# Patient Record
Sex: MALE | Race: WHITE | NOT HISPANIC OR LATINO | Employment: FULL TIME | URBAN - METROPOLITAN AREA
[De-identification: names, ages, dates, MRNs, and addresses within clinical notes are randomized per-mention and may not be internally consistent; named-entity substitution may affect disease eponyms.]

---

## 2017-01-09 ENCOUNTER — COMMUNICATION - HEALTHEAST (OUTPATIENT)
Dept: FAMILY MEDICINE | Facility: CLINIC | Age: 37
End: 2017-01-09

## 2017-01-09 DIAGNOSIS — K21.9 ESOPHAGEAL REFLUX: ICD-10-CM

## 2017-03-16 ENCOUNTER — OFFICE VISIT (OUTPATIENT)
Dept: FAMILY MEDICINE | Facility: CLINIC | Age: 37
End: 2017-03-16
Payer: COMMERCIAL

## 2017-03-16 VITALS
HEART RATE: 54 BPM | SYSTOLIC BLOOD PRESSURE: 132 MMHG | TEMPERATURE: 98.2 F | OXYGEN SATURATION: 97 % | WEIGHT: 205.6 LBS | HEIGHT: 75 IN | RESPIRATION RATE: 22 BRPM | DIASTOLIC BLOOD PRESSURE: 76 MMHG | BODY MASS INDEX: 25.56 KG/M2

## 2017-03-16 DIAGNOSIS — J01.90 ACUTE SINUSITIS WITH SYMPTOMS > 10 DAYS: Primary | ICD-10-CM

## 2017-03-16 DIAGNOSIS — R07.0 THROAT PAIN: ICD-10-CM

## 2017-03-16 LAB
DEPRECATED S PYO AG THROAT QL EIA: NORMAL
MICRO REPORT STATUS: NORMAL
SPECIMEN SOURCE: NORMAL

## 2017-03-16 PROCEDURE — 87081 CULTURE SCREEN ONLY: CPT | Performed by: NURSE PRACTITIONER

## 2017-03-16 PROCEDURE — 99213 OFFICE O/P EST LOW 20 MIN: CPT | Performed by: NURSE PRACTITIONER

## 2017-03-16 PROCEDURE — 87880 STREP A ASSAY W/OPTIC: CPT | Performed by: NURSE PRACTITIONER

## 2017-03-16 RX ORDER — FLUTICASONE PROPIONATE 50 MCG
1-2 SPRAY, SUSPENSION (ML) NASAL DAILY
Qty: 16 G | Refills: 0 | Status: SHIPPED | OUTPATIENT
Start: 2017-03-16

## 2017-03-16 NOTE — LETTER
[facility]  [address] [place] [id number]  Phone: [phone]  Fax: [phone]    March 20, 2017    Price Gray  [address] [facility] [address]              Dear Grabiel Marina,        The results of your recent throat culture were negative.  If you have any further questions or concerns please contact the clinic            Sincerely,      Mili Chin CNP/ sara

## 2017-03-16 NOTE — PATIENT INSTRUCTIONS
Augmentin 875 bid for 10 days was prescribed.  Symptom Relief: Afdrin do not use greater then 3 days  Intranasal corticosteroid   Flonase has  been prescribed.     Tylenol or Ibuprofen if able to take for fevers and discomfort.  Do not exceed 4 grams of Tylenol in a 24 hour period.  Take Ibuprofen with food.      Follow up in 3-5 days if not improving or return sooner if worsening or fail to improve as anticipated.  Follow-up with your primary care provider next week and as needed.    Indications for emergent return to emergency department discussed with patient, who verbalized good understanding and agreement.  Patient understands the limitations of today's evaluation.         Sinusitis (Antibiotic Treatment)    The sinuses are air-filled spaces within the bones of the face. They connect to the inside of the nose. Sinusitis is an inflammation of the tissue lining the sinus cavity. Sinus inflammation can occur during a cold. It can also be due to allergies to pollens and other particles in the air. Sinusitis can cause symptoms of sinus congestion and fullness. A sinus infection causes fever, headache and facial pain. There is often green or yellow drainage from the nose or into the back of the throat (post-nasal drip). You have been given antibiotics to treat this condition.  Home care:    Take the full course of antibiotics as instructed. Do not stop taking them, even if you feel better.    Drink plenty of water, hot tea, and other liquids. This may help thin mucus. It also may promote sinus drainage.    Heat may help soothe painful areas of the face. Use a towel soaked in hot water. Or,  the shower and direct the hot spray onto your face. Using a vaporizer along with a menthol rub at night may also help.     An expectorant containing guaifenesin may help thin the mucus and promote drainage from the sinuses.    Over-the-counter decongestants may be used unless a similar medicine was prescribed. Nasal sprays  work the fastest. Use one that contains phenylephrine or oxymetazoline. First blow the nose gently. Then use the spray. Do not use these medicines more often than directed on the label or symptoms may get worse. You may also use tablets containing pseudoephedrine. Avoid products that combine ingredients, because side effects may be increased. Read labels. You can also ask the pharmacist for help. (NOTE: Persons with high blood pressure should not use decongestants. They can raise blood pressure.)    Over-the-counter antihistamines may help if allergies contributed to your sinusitis.      Do not use nasal rinses or irrigation during an acute sinus infection, unless told to by your health care provider. Rinsing may spread the infection to other sinuses.    Use acetaminophen or ibuprofen to control pain, unless another pain medicine was prescribed. (If you have chronic liver or kidney disease or ever had a stomach ulcer, talk with your doctor before using these medicines. Aspirin should never be used in anyone under 18 years of age who is ill with a fever. It may cause severe liver damage.)    Don't smoke. This can worsen symptoms.  Follow-up care  Follow up with your healthcare provider or our staff if you are not improving within the next week.  When to seek medical advice  Call your healthcare provider if any of these occur:    Facial pain or headache becoming more severe    Stiff neck    Unusual drowsiness or confusion    Swelling of the forehead or eyelids    Vision problems, including blurred or double vision    Fever of 100.4 F (38 C) or higher, or as directed by your healthcare provider    Seizure    Breathing problems    Symptoms not resolving within 10 days    6817-8126 [employer]. [address] [place] [address], [place] [address]. All rights reserved. This information is not intended as a substitute for professional medical care. Always follow your healthcare professional's instructions.

## 2017-03-16 NOTE — MR AVS SNAPSHOT
After Visit Summary   3/16/2017    Price Gray    MRN: [id number]           Patient Information     Date Of Birth          1980        Visit Information        Provider Department      3/16/2017 2:40 PM Mili Chin APRN [facility]        Today's Diagnoses     Throat pain    -  1    Acute sinusitis with symptoms > 10 days          Care Instructions    Augmentin 875 bid for 10 days was prescribed.  Symptom Relief: Afdrin do not use greater then 3 days  Intranasal corticosteroid   Flonase has  been prescribed.     Tylenol or Ibuprofen if able to take for fevers and discomfort.  Do not exceed 4 grams of Tylenol in a 24 hour period.  Take Ibuprofen with food.      Follow up in 3-5 days if not improving or return sooner if worsening or fail to improve as anticipated.  Follow-up with your primary care provider next week and as needed.    Indications for emergent return to emergency department discussed with patient, who verbalized good understanding and agreement.  Patient understands the limitations of today's evaluation.         Sinusitis (Antibiotic Treatment)    The sinuses are air-filled spaces within the bones of the face. They connect to the inside of the nose. Sinusitis is an inflammation of the tissue lining the sinus cavity. Sinus inflammation can occur during a cold. It can also be due to allergies to pollens and other particles in the air. Sinusitis can cause symptoms of sinus congestion and fullness. A sinus infection causes fever, headache and facial pain. There is often green or yellow drainage from the nose or into the back of the throat (post-nasal drip). You have been given antibiotics to treat this condition.  Home care:    Take the full course of antibiotics as instructed. Do not stop taking them, even if you feel better.    Drink plenty of water, hot tea, and other liquids. This may help thin mucus. It also may promote sinus drainage.    Heat may  help soothe painful areas of the face. Use a towel soaked in hot water. Or,  the shower and direct the hot spray onto your face. Using a vaporizer along with a menthol rub at night may also help.     An expectorant containing guaifenesin may help thin the mucus and promote drainage from the sinuses.    Over-the-counter decongestants may be used unless a similar medicine was prescribed. Nasal sprays work the fastest. Use one that contains phenylephrine or oxymetazoline. First blow the nose gently. Then use the spray. Do not use these medicines more often than directed on the label or symptoms may get worse. You may also use tablets containing pseudoephedrine. Avoid products that combine ingredients, because side effects may be increased. Read labels. You can also ask the pharmacist for help. (NOTE: Persons with high blood pressure should not use decongestants. They can raise blood pressure.)    Over-the-counter antihistamines may help if allergies contributed to your sinusitis.      Do not use nasal rinses or irrigation during an acute sinus infection, unless told to by your health care provider. Rinsing may spread the infection to other sinuses.    Use acetaminophen or ibuprofen to control pain, unless another pain medicine was prescribed. (If you have chronic liver or kidney disease or ever had a stomach ulcer, talk with your doctor before using these medicines. Aspirin should never be used in anyone under 18 years of age who is ill with a fever. It may cause severe liver damage.)    Don't smoke. This can worsen symptoms.  Follow-up care  Follow up with your healthcare provider or our staff if you are not improving within the next week.  When to seek medical advice  Call your healthcare provider if any of these occur:    Facial pain or headache becoming more severe    Stiff neck    Unusual drowsiness or confusion    Swelling of the forehead or eyelids    Vision problems, including blurred or double  "vision    Fever of 100.4 F (38 C) or higher, or as directed by your healthcare provider    Seizure    Breathing problems    Symptoms not resolving within 10 days    9425-6879 [employer]. [address] [place] [address], [place] [address]. All rights reserved. This information is not intended as a substitute for professional medical care. Always follow your healthcare professional's instructions.              Follow-ups after your visit        Who to contact     If you have questions or need follow up information about today's clinic visit or your schedule please contact [facility] directly at [phone].  Normal or non-critical lab and imaging results will be communicated to you by [employer], letter or phone within 4 business days after the clinic has received the results. If you do not hear from us within 7 days, please contact the clinic through [employer] or phone. If you have a critical or abnormal lab result, we will notify you by phone as soon as possible.  Submit refill requests through [employer] or call your pharmacy and they will forward the refill request to us. Please allow 3 business days for your refill to be completed.          Additional Information About Your Visit        MyChart Information     [employer] lets you send messages to your doctor, view your test results, renew your prescriptions, schedule appointments and more. To sign up, go to [email or web address] . Click on \"Log in\" on the left side of the screen, which will take you to the Welcome page. Then click on \"Sign up Now\" on the right side of the page.     You will be asked to enter the access code listed below, as well as some personal information. Please follow the directions to create your username and password.     Your access code is: [email or web address]  Expires: 2017  3:22 PM     Your access code will  in 90 days. If you need help or a new code, please call your [facility] or [phone].        Care " "EveryWhere ID     This is your Care EveryWhere ID. This could be used by other organizations to access your [facility] medical records  [id number]        Your Vitals Were     Pulse Temperature Respirations Height Pulse Oximetry BMI (Body Mass Index)    54 98.2  F (36.8  C) (Tympanic) 22 6' 3\" (1.905 m) 97% 25.7 kg/m2       Blood Pressure from Last 3 Encounters:   03/16/17 132/76    Weight from Last 3 Encounters:   03/16/17 205 lb 9.6 oz (93.3 kg)              We Performed the Following     Strep, Rapid Screen          Today's Medication Changes          These changes are accurate as of: 3/16/17  3:22 PM.  If you have any questions, ask your nurse or doctor.               Start taking these medicines.        Dose/Directions    amoxicillin-clavulanate 875-125 MG per tablet   Commonly known as:  AUGMENTIN   Used for:  Acute sinusitis with symptoms > 10 days   Started by:  Mili Chin APRN CNP        Dose:  1 tablet   Take 1 tablet by mouth 2 times daily   Quantity:  20 tablet   Refills:  0       fluticasone 50 MCG/ACT spray   Commonly known as:  FLONASE   Used for:  Acute sinusitis with symptoms > 10 days   Started by:  Mili Chin APRN CNP        Dose:  1-2 spray   Spray 1-2 sprays into both nostrils daily   Quantity:  16 g   Refills:  0            Where to get your medicines      These medications were sent to [id number] [address] [facility]  [address] [facility] [address]    Hours:  Closed 10-16-08 business to [facility] Phone:  [phone]     amoxicillin-clavulanate 875-125 MG per tablet    fluticasone 50 MCG/ACT spray                Primary Care Provider    None Specified       No primary provider on file.        Thank you!     Thank you for choosing [facility]  for your care. Our goal is always to provide you with excellent care. Hearing back from our patients is one way we can continue to improve our services. Please take a few minutes to " complete the written survey that you may receive in the mail after your visit with us. Thank you!             Your Updated Medication List - Protect others around you: Learn how to safely use, store and throw away your medicines at www.disposemymeds.org.          This list is accurate as of: 3/16/17  3:22 PM.  Always use your most recent med list.                   Brand Name Dispense Instructions for use    amoxicillin-clavulanate 875-125 MG per tablet    AUGMENTIN    20 tablet    Take 1 tablet by mouth 2 times daily       CLOBETASOL & CLOBETASOL EMUL EX          fluticasone 50 MCG/ACT spray    FLONASE    16 g    Spray 1-2 sprays into both nostrils daily       PLAQUENIL PO          PROTONIX PO

## 2017-03-16 NOTE — PROGRESS NOTES
"  SUBJECTIVE:                                                    Price Gray is a 36 year old male who presents to clinic today for the following health issues:      ENT Symptoms             Symptoms: cc Present Absent Comment   Fever/Chills    unkown   Fatigue  x     Muscle Aches  x     Eye Irritation   x    Sneezing   x    Nasal Miko/Drg  x     Sinus Pressure/Pain  x     Loss of smell   x    Dental pain   x    Sore Throat  x     Swollen Glands  x     Ear Pain/Fullness  x  pressure   Cough  x  Last week   Wheeze   x    Chest Pain   x    Shortness of breath   x    Rash   x    Other   x      Symptom duration:  8-9 days   Symptom severity:  moderate to severe   Treatments tried:  rest, ibuprofen   Contacts:  kids had strep       History reviewed. No pertinent past medical history.    Social History   Substance Use Topics     Smoking status: Current Every Day Smoker     Types: Dip, chew, snus or snuff     Smokeless tobacco: Not on file     Alcohol use Yes      Comment: weekly       ROS:  CONSTITUTIONAL:NEGATIVE for fever, chills, change in weight  INTEGUMENTARY/SKIN: NEGATIVE for worrisome rashes, moles or lesions  EYES: NEGATIVE for vision changes or irritation  ENT/MOUTH: See above   RESP:NEGATIVE for significant cough or SOB  CV: NEGATIVE for chest pain, palpitations or peripheral edema  GI: NEGATIVE for nausea, abdominal pain, heartburn, or change in bowel habits  MUSCULOSKELETAL: NEGATIVE for significant arthralgias or myalgia  NEURO: NEGATIVE for weakness, dizziness or paresthesias      OBJECTIVE:   /76  Pulse 54  Temp 98.2  F (36.8  C) (Tympanic)  Resp 22  Ht 6' 3\" (1.905 m)  Wt 205 lb 9.6 oz (93.3 kg)  SpO2 97%  BMI 25.7 kg/m2  General: healthy, alert and no distress  Eyes - conjunctivae clear.  Ears - External ears normal. Canals clear. TM's normal.  Nose/Sinuses - Nares normal.Mucosa normal. No drainage or sinus tenderness.  ENT: Maxillary sinus tenderness, bilateral turbinates inflamed and " edematous    Oropharynx - Lips, mucosa, and tongue normal. Positive findings: oropharyngeal erythema, no tonsillar hypertrophy no exudates present,   Neck - Neck supple;negative anterior cervical nodes,  Lungs - Lungs clear; no wheezing or rales.  Heart - regular rate and rhythm. No murmurs, rub.    Labs:  Results for orders placed or performed in visit on 03/16/17   Strep, Rapid Screen   Result Value Ref Range    Specimen Description Throat     Rapid Strep A Screen       NEGATIVE: No Group A streptococcal antigen detected by immunoassay, await   culture report.      Micro Report Status FINAL 03/16/2017          ASSESSMENT:     ICD-10-CM    1. Acute sinusitis with symptoms > 10 days J01.90 fluticasone (FLONASE) 50 MCG/ACT spray     amoxicillin-clavulanate (AUGMENTIN) 875-125 MG per tablet   2. Throat pain R07.0 Strep, Rapid Screen     Beta strep group A culture         PLAN:  Patient Instructions   Augmentin 875 bid for 10 days was prescribed.  Symptom Relief: Afdrin do not use greater then 3 days  Intranasal corticosteroid   Flonase has  been prescribed.     Tylenol or Ibuprofen if able to take for fevers and discomfort.  Do not exceed 4 grams of Tylenol in a 24 hour period.  Take Ibuprofen with food.      Follow up in 3-5 days if not improving or return sooner if worsening or fail to improve as anticipated.  Follow-up with your primary care provider next week and as needed.    Indications for emergent return to emergency department discussed with patient, who verbalized good understanding and agreement.  Patient understands the limitations of today's evaluation.         Sinusitis (Antibiotic Treatment)    The sinuses are air-filled spaces within the bones of the face. They connect to the inside of the nose. Sinusitis is an inflammation of the tissue lining the sinus cavity. Sinus inflammation can occur during a cold. It can also be due to allergies to pollens and other particles in the air. Sinusitis can cause  symptoms of sinus congestion and fullness. A sinus infection causes fever, headache and facial pain. There is often green or yellow drainage from the nose or into the back of the throat (post-nasal drip). You have been given antibiotics to treat this condition.  Home care:    Take the full course of antibiotics as instructed. Do not stop taking them, even if you feel better.    Drink plenty of water, hot tea, and other liquids. This may help thin mucus. It also may promote sinus drainage.    Heat may help soothe painful areas of the face. Use a towel soaked in hot water. Or,  the shower and direct the hot spray onto your face. Using a vaporizer along with a menthol rub at night may also help.     An expectorant containing guaifenesin may help thin the mucus and promote drainage from the sinuses.    Over-the-counter decongestants may be used unless a similar medicine was prescribed. Nasal sprays work the fastest. Use one that contains phenylephrine or oxymetazoline. First blow the nose gently. Then use the spray. Do not use these medicines more often than directed on the label or symptoms may get worse. You may also use tablets containing pseudoephedrine. Avoid products that combine ingredients, because side effects may be increased. Read labels. You can also ask the pharmacist for help. (NOTE: Persons with high blood pressure should not use decongestants. They can raise blood pressure.)    Over-the-counter antihistamines may help if allergies contributed to your sinusitis.      Do not use nasal rinses or irrigation during an acute sinus infection, unless told to by your health care provider. Rinsing may spread the infection to other sinuses.    Use acetaminophen or ibuprofen to control pain, unless another pain medicine was prescribed. (If you have chronic liver or kidney disease or ever had a stomach ulcer, talk with your doctor before using these medicines. Aspirin should never be used in anyone under 18  years of age who is ill with a fever. It may cause severe liver damage.)    Don't smoke. This can worsen symptoms.  Follow-up care  Follow up with your healthcare provider or our staff if you are not improving within the next week.  When to seek medical advice  Call your healthcare provider if any of these occur:    Facial pain or headache becoming more severe    Stiff neck    Unusual drowsiness or confusion    Swelling of the forehead or eyelids    Vision problems, including blurred or double vision    Fever of 100.4 F (38 C) or higher, or as directed by your healthcare provider    Seizure    Breathing problems    Symptoms not resolving within 10 days    8021-0265 [employer]. [address] [place] [address]. All rights reserved. This information is not intended as a substitute for professional medical care. Always follow your healthcare professional's instructions.               Mili Chin CNP

## 2017-03-18 LAB
BACTERIA SPEC CULT: NORMAL
MICRO REPORT STATUS: NORMAL
SPECIMEN SOURCE: NORMAL

## 2017-04-09 ENCOUNTER — COMMUNICATION - HEALTHEAST (OUTPATIENT)
Dept: FAMILY MEDICINE | Facility: CLINIC | Age: 37
End: 2017-04-09

## 2017-04-09 DIAGNOSIS — K21.9 ESOPHAGEAL REFLUX: ICD-10-CM

## 2017-06-24 ENCOUNTER — COMMUNICATION - HEALTHEAST (OUTPATIENT)
Dept: FAMILY MEDICINE | Facility: CLINIC | Age: 37
End: 2017-06-24

## 2017-06-24 DIAGNOSIS — K21.9 ESOPHAGEAL REFLUX: ICD-10-CM

## 2017-10-04 ENCOUNTER — COMMUNICATION - HEALTHEAST (OUTPATIENT)
Dept: FAMILY MEDICINE | Facility: CLINIC | Age: 37
End: 2017-10-04

## 2017-10-04 DIAGNOSIS — K21.9 ESOPHAGEAL REFLUX: ICD-10-CM

## 2018-01-01 ENCOUNTER — COMMUNICATION - HEALTHEAST (OUTPATIENT)
Dept: FAMILY MEDICINE | Facility: CLINIC | Age: 38
End: 2018-01-01

## 2018-01-01 DIAGNOSIS — K21.9 ESOPHAGEAL REFLUX: ICD-10-CM

## 2018-03-28 ENCOUNTER — COMMUNICATION - HEALTHEAST (OUTPATIENT)
Dept: FAMILY MEDICINE | Facility: CLINIC | Age: 38
End: 2018-03-28

## 2018-03-28 DIAGNOSIS — K21.9 ESOPHAGEAL REFLUX: ICD-10-CM

## 2018-06-26 ENCOUNTER — COMMUNICATION - HEALTHEAST (OUTPATIENT)
Dept: FAMILY MEDICINE | Facility: CLINIC | Age: 38
End: 2018-06-26

## 2018-06-26 DIAGNOSIS — K21.9 ESOPHAGEAL REFLUX: ICD-10-CM

## 2018-09-26 ENCOUNTER — COMMUNICATION - HEALTHEAST (OUTPATIENT)
Dept: FAMILY MEDICINE | Facility: CLINIC | Age: 38
End: 2018-09-26

## 2018-09-26 DIAGNOSIS — K21.9 ESOPHAGEAL REFLUX: ICD-10-CM

## 2018-12-26 ENCOUNTER — COMMUNICATION - HEALTHEAST (OUTPATIENT)
Dept: FAMILY MEDICINE | Facility: CLINIC | Age: 38
End: 2018-12-26

## 2018-12-26 DIAGNOSIS — K21.9 ESOPHAGEAL REFLUX: ICD-10-CM

## 2019-01-17 ENCOUNTER — OFFICE VISIT - HEALTHEAST (OUTPATIENT)
Dept: FAMILY MEDICINE | Facility: CLINIC | Age: 39
End: 2019-01-17

## 2019-01-17 DIAGNOSIS — Z00.00 ROUTINE PHYSICAL EXAMINATION: ICD-10-CM

## 2019-01-17 DIAGNOSIS — M13.0 POLYARTHRITIS: ICD-10-CM

## 2019-01-17 DIAGNOSIS — K21.9 GASTROESOPHAGEAL REFLUX DISEASE WITHOUT ESOPHAGITIS: ICD-10-CM

## 2019-01-17 LAB
ALBUMIN SERPL-MCNC: 3.9 G/DL (ref 3.5–5)
ALP SERPL-CCNC: 70 U/L (ref 45–120)
ALT SERPL W P-5'-P-CCNC: 22 U/L (ref 0–45)
ANION GAP SERPL CALCULATED.3IONS-SCNC: 9 MMOL/L (ref 5–18)
AST SERPL W P-5'-P-CCNC: 26 U/L (ref 0–40)
BILIRUB SERPL-MCNC: 0.9 MG/DL (ref 0–1)
BUN SERPL-MCNC: 18 MG/DL (ref 8–22)
CALCIUM SERPL-MCNC: 9.5 MG/DL (ref 8.5–10.5)
CHLORIDE BLD-SCNC: 102 MMOL/L (ref 98–107)
CHOLEST SERPL-MCNC: 224 MG/DL
CO2 SERPL-SCNC: 31 MMOL/L (ref 22–31)
CREAT SERPL-MCNC: 0.83 MG/DL (ref 0.7–1.3)
ERYTHROCYTE [DISTWIDTH] IN BLOOD BY AUTOMATED COUNT: 11.8 % (ref 11–14.5)
FASTING STATUS PATIENT QL REPORTED: YES
GFR SERPL CREATININE-BSD FRML MDRD: >60 ML/MIN/1.73M2
GLUCOSE BLD-MCNC: 79 MG/DL (ref 70–125)
HCT VFR BLD AUTO: 45.5 % (ref 40–54)
HDLC SERPL-MCNC: 71 MG/DL
HGB BLD-MCNC: 15.2 G/DL (ref 14–18)
LDLC SERPL CALC-MCNC: 143 MG/DL
MCH RBC QN AUTO: 28.6 PG (ref 27–34)
MCHC RBC AUTO-ENTMCNC: 33.4 G/DL (ref 32–36)
MCV RBC AUTO: 86 FL (ref 80–100)
PLATELET # BLD AUTO: 263 THOU/UL (ref 140–440)
PMV BLD AUTO: 6.4 FL (ref 7–10)
POTASSIUM BLD-SCNC: 4.3 MMOL/L (ref 3.5–5)
PROT SERPL-MCNC: 6.7 G/DL (ref 6–8)
RBC # BLD AUTO: 5.31 MILL/UL (ref 4.4–6.2)
SODIUM SERPL-SCNC: 142 MMOL/L (ref 136–145)
TRIGL SERPL-MCNC: 50 MG/DL
WBC: 4.7 THOU/UL (ref 4–11)

## 2019-01-17 ASSESSMENT — MIFFLIN-ST. JEOR: SCORE: 1933.65

## 2019-01-18 ENCOUNTER — COMMUNICATION - HEALTHEAST (OUTPATIENT)
Dept: FAMILY MEDICINE | Facility: CLINIC | Age: 39
End: 2019-01-18

## 2019-02-27 ENCOUNTER — COMMUNICATION - HEALTHEAST (OUTPATIENT)
Dept: FAMILY MEDICINE | Facility: CLINIC | Age: 39
End: 2019-02-27

## 2019-02-27 ENCOUNTER — AMBULATORY - HEALTHEAST (OUTPATIENT)
Dept: FAMILY MEDICINE | Facility: CLINIC | Age: 39
End: 2019-02-27

## 2019-02-27 DIAGNOSIS — K21.9 ESOPHAGEAL REFLUX: ICD-10-CM

## 2020-02-27 ENCOUNTER — COMMUNICATION - HEALTHEAST (OUTPATIENT)
Dept: FAMILY MEDICINE | Facility: CLINIC | Age: 40
End: 2020-02-27

## 2020-02-27 DIAGNOSIS — K21.9 ESOPHAGEAL REFLUX: ICD-10-CM

## 2020-02-28 ENCOUNTER — COMMUNICATION - HEALTHEAST (OUTPATIENT)
Dept: FAMILY MEDICINE | Facility: CLINIC | Age: 40
End: 2020-02-28

## 2020-02-28 DIAGNOSIS — K21.9 ESOPHAGEAL REFLUX: ICD-10-CM

## 2021-02-09 ENCOUNTER — OFFICE VISIT - HEALTHEAST (OUTPATIENT)
Dept: FAMILY MEDICINE | Facility: CLINIC | Age: 41
End: 2021-02-09

## 2021-02-09 DIAGNOSIS — M79.662 PAIN IN BOTH LOWER LEGS: ICD-10-CM

## 2021-02-09 DIAGNOSIS — M06.4 INFLAMMATORY POLYARTHROPATHY (H): ICD-10-CM

## 2021-02-09 DIAGNOSIS — M79.661 PAIN IN BOTH LOWER LEGS: ICD-10-CM

## 2021-02-09 DIAGNOSIS — K21.9 ESOPHAGEAL REFLUX: ICD-10-CM

## 2021-02-09 ASSESSMENT — PATIENT HEALTH QUESTIONNAIRE - PHQ9: SUM OF ALL RESPONSES TO PHQ QUESTIONS 1-9: 0

## 2021-02-11 ENCOUNTER — AMBULATORY - HEALTHEAST (OUTPATIENT)
Dept: LAB | Facility: CLINIC | Age: 41
End: 2021-02-11

## 2021-02-11 DIAGNOSIS — M79.661 PAIN IN BOTH LOWER LEGS: ICD-10-CM

## 2021-02-11 DIAGNOSIS — M79.662 PAIN IN BOTH LOWER LEGS: ICD-10-CM

## 2021-02-11 DIAGNOSIS — M06.4 INFLAMMATORY POLYARTHROPATHY (H): ICD-10-CM

## 2021-02-11 LAB
ALBUMIN SERPL-MCNC: 4.1 G/DL (ref 3.5–5)
ALP SERPL-CCNC: 55 U/L (ref 45–120)
ALT SERPL W P-5'-P-CCNC: 17 U/L (ref 0–45)
ANION GAP SERPL CALCULATED.3IONS-SCNC: 9 MMOL/L (ref 5–18)
AST SERPL W P-5'-P-CCNC: 24 U/L (ref 0–40)
BILIRUB SERPL-MCNC: 0.6 MG/DL (ref 0–1)
BUN SERPL-MCNC: 14 MG/DL (ref 8–22)
CALCIUM SERPL-MCNC: 9.3 MG/DL (ref 8.5–10.5)
CHLORIDE BLD-SCNC: 104 MMOL/L (ref 98–107)
CO2 SERPL-SCNC: 30 MMOL/L (ref 22–31)
CREAT SERPL-MCNC: 0.96 MG/DL (ref 0.7–1.3)
ERYTHROCYTE [DISTWIDTH] IN BLOOD BY AUTOMATED COUNT: 12.1 % (ref 11–14.5)
ERYTHROCYTE [SEDIMENTATION RATE] IN BLOOD BY WESTERGREN METHOD: 2 MM/HR (ref 0–15)
GFR SERPL CREATININE-BSD FRML MDRD: >60 ML/MIN/1.73M2
GLUCOSE BLD-MCNC: 73 MG/DL (ref 70–125)
HCT VFR BLD AUTO: 44.5 % (ref 40–54)
HGB BLD-MCNC: 15 G/DL (ref 14–18)
MAGNESIUM SERPL-MCNC: 2.1 MG/DL (ref 1.8–2.6)
MCH RBC QN AUTO: 28.5 PG (ref 27–34)
MCHC RBC AUTO-ENTMCNC: 33.7 G/DL (ref 32–36)
MCV RBC AUTO: 85 FL (ref 80–100)
PLATELET # BLD AUTO: 242 THOU/UL (ref 140–440)
PMV BLD AUTO: 8.5 FL (ref 7–10)
POTASSIUM BLD-SCNC: 4.4 MMOL/L (ref 3.5–5)
PROT SERPL-MCNC: 6.4 G/DL (ref 6–8)
RBC # BLD AUTO: 5.26 MILL/UL (ref 4.4–6.2)
SODIUM SERPL-SCNC: 143 MMOL/L (ref 136–145)
TSH SERPL DL<=0.005 MIU/L-ACNC: 1.39 UIU/ML (ref 0.3–5)
WBC: 3.1 THOU/UL (ref 4–11)

## 2021-02-12 ENCOUNTER — COMMUNICATION - HEALTHEAST (OUTPATIENT)
Dept: FAMILY MEDICINE | Facility: CLINIC | Age: 41
End: 2021-02-12

## 2021-02-12 LAB — ANA SER QL: 0.2 U

## 2021-02-16 ENCOUNTER — COMMUNICATION - HEALTHEAST (OUTPATIENT)
Dept: ADMINISTRATIVE | Facility: CLINIC | Age: 41
End: 2021-02-16

## 2021-05-27 ASSESSMENT — PATIENT HEALTH QUESTIONNAIRE - PHQ9: SUM OF ALL RESPONSES TO PHQ QUESTIONS 1-9: 0

## 2021-06-02 ENCOUNTER — RECORDS - HEALTHEAST (OUTPATIENT)
Dept: ADMINISTRATIVE | Facility: CLINIC | Age: 41
End: 2021-06-02

## 2021-06-02 VITALS — HEIGHT: 75 IN | WEIGHT: 209 LBS | BODY MASS INDEX: 25.99 KG/M2

## 2021-06-06 NOTE — TELEPHONE ENCOUNTER
RN cannot approve Refill Request    RN can NOT refill this medication Protocol failed and NO refill given.      Shanelle Laurent, [facility] Triage/Med Refill 2/28/2020    Requested Prescriptions   Pending Prescriptions Disp Refills     pantoprazole (PROTONIX) 40 MG tablet [Pharmacy Med Name: PANTOPRAZOLE 40MG] 90 tablet 3     Sig: TAKE 1 TABLET BY MOUTH EVERY DAY       GI Medications Refill Protocol Failed - 2/28/2020  9:25 AM        Failed - PCP or prescribing provider visit in last 12 or next 3 months.     Last office visit with prescriber/PCP: 5/3/2016 Tito Romero MD OR same dept: Visit date not found OR same specialty: 5/3/2016 Tito Romero MD  Last physical: 1/17/2019 Last MTM visit: Visit date not found   Next visit within 3 mo: Visit date not found  Next physical within 3 mo: Visit date not found  Prescriber OR PCP: Tito Romero MD  Last diagnosis associated with med order: 1. Esophageal reflux  - pantoprazole (PROTONIX) 40 MG tablet [Pharmacy Med Name: PANTOPRAZOLE 40MG]; TAKE 1 TABLET BY MOUTH EVERY DAY  Dispense: 90 tablet; Refill: 0    If protocol passes may refill for 12 months if within 3 months of last provider visit (or a total of 15 months).

## 2021-06-15 NOTE — TELEPHONE ENCOUNTER
----- Message from Tito Romero MD sent at 2/12/2021 10:17 AM CST -----  Thyroid levels are normal.  Kidney and liver function normal, no abnormalities with electrolytes to explain symptoms.  I would reach out to rheumatologist about a trial off the hydroxychloroquine to see if symptoms change.

## 2021-06-15 NOTE — TELEPHONE ENCOUNTER
Reason for call:  Patient reporting a symptom    Symptom or request: seen last week by Dr Romero for leg pain- pt reporting that this weekend he did have numbness & tingling in right leg which is new for him so wanted to report it.    Duration (how long have symptoms been present): 4 days- worse when sitting long periods    Have you been treated for this before? Yes    Additional comments: please advise    Phone Number patient can be reached at:  Home number on file [phone] (home)    Best Time:  na    Can we leave a detailed message on this number: Yes    Call taken on 2/16/2021 at 9:06 AM by Mandy Medellin

## 2021-06-15 NOTE — TELEPHONE ENCOUNTER
Pt stated he has not taken hydroxychloroquine in 4 days but also has not spoken to his rheumatologist.

## 2021-06-15 NOTE — PROGRESS NOTES
Price Gray is a 40 y.o. male who is being evaluated via a billable video visit.      How would you like to obtain your AVS? MyChart.  If dropped from the video visit, the video invitation should be resent by: Text to cell phone: [phone]  Will anyone else be joining your video visit? No      Video Start Time: 1053 am  Assessment & Plan     Pain in both lower legs  Discussed evaluating for possible different etiologies and starting with blood work.  Will assess for electrolyte deficiencies vitamin deficiencies thyroid abnormalities anemia autoimmune or inflammatory processes that could be causing patient's symptoms in his bilateral lower legs  If no abnormalities are noted on blood work discussed a bit possible trial off of hydroxychloroquine which he takes for his inflammatory polyarthritis.  Low percentage but is listed as a muscular side effect is myalgias which she is describing in proximal musculature.  - pantoprazole (PROTONIX) 40 MG tablet  Dispense: 90 tablet; Refill: 3  - HM2(CBC w/o Differential)  - Thyroid Josephine  - Comprehensive Metabolic Panel  - Antinuclear Antibody (RONNIE) Cascade  - Magnesium  - Sedimentation Rate    Esophageal reflux  Patient feels his pantoprazole is working well would like refill sent to the pharmacy  Of electrolyte disturbances or vitamin disturbances are shown consideration for decreasing Protonix  - pantoprazole (PROTONIX) 40 MG tablet  Dispense: 90 tablet; Refill: 3    Inflammatory polyarthropathy (H)  Patient is following with rheumatology is on hydroxychloroquine  Up-to-date review of possible side effects do include some myalgias of proximal musculature with hydroxychloroquine  We will search for other etiologies but consider trial off medication to see if this is a side effect  - pantoprazole (PROTONIX) 40 MG tablet  Dispense: 90 tablet; Refill: 3  - HM2(CBC w/o Differential)  - Thyroid Josephine  - Comprehensive Metabolic Panel  - Antinuclear Antibody (RONNIE)  Cascade  - Magnesium  - Sedimentation Rate    [id number]}     No follow-ups on file.    Tito Romero MD  [facility]    Subjective     Price Gray is 40 y.o. and presents to clinic today for the following health issues   HPI   40-year-old male interviewed via video visit during Covid pandemic concerns with bilateral leg abnormalities has been noted for the last few weeks.  Patient has been trying to increase activity and lose a few pounds he has been successful lost about 7 in the last few weeks but is noted marked discomfort and irritation in his bilateral lower extremities in the proximal musculature-sparing more distal muscle groups of the calf and feet.  He has no upper extremity symptoms.  He has been exercising on a regular basis and is really not been a new routine.  Discussed with him different possible etiologies for the myalgias he is describing in his bilateral legs and we will start with blood work for evaluation of different possibilities.  He has taken hydroxychloroquine for polyarthritis from his rheumatologist it is a possible side effect however low with myalgias we will consider going off hydroxychloroquine if there is no lab abnormalities.  There is been no trauma to the lumbar back to the patient's knowledge but he does have possible psoriatic arthritis-I think is of lower likelihood to be affecting the bilateral similar areas in the lower back to be causing bilateral leg discomfort.  These options were discussed with the patient is in agreement to start with lab work.  Refills of his proton pump inhibitor sent to the pharmacy.  If electrolyte or vitamin abnormalities noted consideration for lowering PPI dosing.      Review of Systems  Negative other than stated above      Objective    Vitals - Patient Reported  Weight (Patient Reported): 195 lb (88.5 kg)    Physical Exam  Video visit completed today  Patient at home appears in no apparent  distress  Otherwise appears well on video          Video-Visit Details    Type of service:  Video Visit    Video End Time (time video stopped): 11:09 AM  Originating Location (pt. Location): Home    Distant Location (provider location):  [facility]     Platform used for Video Visit: Favian

## 2021-06-23 NOTE — TELEPHONE ENCOUNTER
----- Message from Tito Romero MD sent at 1/18/2019  3:08 PM CST -----  Cholesterol levels have risen and are outside of goal range-I recommend working on getting regular cardiovascular exercise and lowering cholesterol in the diet.  Kidney function liver function are normal with no signs of diabetes.  No other concerns on blood work.

## 2021-06-23 NOTE — TELEPHONE ENCOUNTER
Reason contacted:  Lab results   Information relayed:  Message from provider below   Additional questions:  No  Further follow-up needed:  No  Okay to leave a detailed message:  Yes

## 2021-06-23 NOTE — PROGRESS NOTES
Assessment:      Healthy male exam.    [facility]  GERD  Polyarthritis  Plan:       All questions answered.   [facility]-will obtain blood work today and f/u based on results, update with flu vaccine today  GERD-patient doing well on current dosing we will send refills as needed-when he periodically forgets to take the medication he usually has acid reflux symptoms within the day-discussed with him to keep on taking the medication  Polyarthritis-within the last year patient has discontinued his Plaquenil-he has not had any further problems in the last year with joint problems since being off the medications and he plans on staying off of them until he has a flare  Subjective:      Price Gray is a 38 y.o. male who presents for an annual exam. The patient reports that there is not domestic violence in his life.  Patient is here for yearly exam.  He is fasting and interested in fasting blood work.  He is due for flu vaccine.  We reviewed last years laboratory values with him.  He takes a proton pump inhibitor which controls his symptoms well.  He had previously been on Plaquenil for polyarthritis in the past and is actually discontinued the medication on his own.  He has been off it for greater than a half of a year and has not had any problems.  He wants to continue to monitor.  He is happy not to be taking the medication especially with the follow-up that was needed for monitoring.  He continues to work as an .  He regularly exercises with cross-country skiing    Healthy Habits:   Regular Exercise: Yes  Sunscreen Use: Yes  Healthy Diet: Yes  Dental Visits Regularly: Yes  Seat Belt: Yes  Sexually active: Yes  Lipid Profile: Yes  Glucose Screen: Yes        Immunization History   Administered Date(s) Administered     DT (pediatric) 11/07/2006     Influenza, seasonal,quad inj 36+ mos 01/17/2019     Influenza, seasonal,quad inj 6-35 mos 11/12/2008, 01/11/2013     Td,adult,historic,unspecified 11/07/2006     Tdap  08/16/2012     Immunization status: up to date and documented.    No exam data present    Current Outpatient Medications   Medication Sig Dispense Refill     cholecalciferol, vitamin D3, (VITAMIN D3) 2,000 unit cap Take by mouth.       clobetasol (TEMOVATE) 0.05 % ointment Apply topically to skin as needed 30 g 2     multivitamin therapeutic (THERAGRAN) tablet Take 1 tablet by mouth daily.       omega-3 fatty acids-vitamin E (FISH OIL) 1,000 mg cap Take by mouth.       pantoprazole (PROTONIX) 40 MG tablet TAKE 1 TABLET BY MOUTH DAILY 30 tablet 1     No current facility-administered medications for this visit.      No past medical history on file.  Past Surgical History:   Procedure Laterality Date     UT KNEE SCOPE,DIAGNOSTIC      Description: Arthroscopy Knee Left;  Recorded: 08/09/2010;  Comments: 2000, meniscal repair     UT KNEE SCOPE,DIAGNOSTIC      Description: Arthroscopy Knee Right;  Recorded: 08/09/2010;  Comments: 1997, meniscal repair     UT REMOVAL OF SPERM DUCT(S)      Description: Surgery Of Male Genitalia Vasectomy;  Recorded: 09/24/2010;  Comments: 9/2010     Patient has no known allergies.  Family History   Problem Relation Age of Onset     Hypertension Father      Coronary artery disease Maternal Grandmother      Social History     Socioeconomic History     Marital status:      Spouse name: Not on file     Number of children: Not on file     Years of education: Not on file     Highest education level: Not on file   Social Needs     Financial resource strain: Not on file     Food insecurity - worry: Not on file     Food insecurity - inability: Not on file     Transportation needs - medical: Not on file     Transportation needs - non-medical: Not on file   Occupational History     Occupation:      Employer: [employer]   Tobacco Use     Smoking status: Former Smoker     Smokeless tobacco: Current User     Types: Chew   Substance and Sexual Activity     Alcohol use: Yes      "Comment: Rare     Drug use: Not on file     Sexual activity: Not on file   Other Topics Concern     Not on file   Social History Narrative            Review of Systems  General:  Denies problem  Eyes: Denies problem  Ears/Nose/Throat: Denies problem  Cardiovascular: Denies problem  Respiratory:  Denies problem  Gastrointestinal:  Denies problem  Genitourinary: Denies problem  Musculoskeletal:  Denies problem  Skin: Denies problem  Neurologic: Denies problem  Psychiatric: Denies problem  Endocrine: Denies problem  Heme/Lymphatic: Denies problem   Allergic/Immunologic: Denies problem        Objective:     Vitals:    01/17/19 0807   BP: 126/77   Pulse: 63   Resp: 16   Temp: 97  F (36.1  C)   TempSrc: Oral   Weight: 209 lb (94.8 kg)   Height: 6' 3\" (1.905 m)     Body mass index is 26.12 kg/m .    Physical  General Appearance: Alert, cooperative, no distress, appears stated age  Head: Normocephalic, without obvious abnormality, atraumatic  Eyes: PERRL, conjunctiva/corneas clear, EOM's intact  Ears: Normal TM's and external ear canals, both ears  Nose: Nares normal, septum midline,mucosa normal, no drainage  Throat: Lips, mucosa, and tongue normal; teeth and gums normal  Neck: Supple, symmetrical, trachea midline, no adenopathy;  thyroid: not enlarged, symmetric, no tenderness/mass/nodules; no carotid bruit or JVD  Back: Symmetric, no curvature, ROM normal, no CVA tenderness  Lungs: Clear to auscultation bilaterally, respirations unlabored  Heart: Regular rate and rhythm, S1 and S2 normal, no murmur, rub, or gallop,  Abdomen: Soft, non-tender, bowel sounds active all four quadrants,  no masses, no organomegaly  Genitourinary: deferred, no concerns  Musculoskeletal: Normal range of motion. No joint swelling or deformity.   Extremities: Extremities normal, atraumatic, no cyanosis or edema  Skin: Skin color, texture, turgor normal, no rashes or lesions  Lymph nodes: Cervical, supraclavicular, and " axillary nodes normal  Neurologic: He is alert. He has normal reflexes.   Psychiatric: He has a normal mood and affect.

## 2021-06-24 NOTE — TELEPHONE ENCOUNTER
Refill Approved    Rx renewed per Medication Renewal Policy. Medication was last renewed on 12/27/18.    Shanelle Laurent, [facility] Triage/Med Refill 2/27/2019     Requested Prescriptions   Pending Prescriptions Disp Refills     pantoprazole (PROTONIX) 40 MG tablet 30 tablet 1     Sig: Take 1 tablet (40 mg total) by mouth daily.    GI Medications Refill Protocol Passed - 2/27/2019  3:19 PM       Passed - PCP or prescribing provider visit in last 12 or next 3 months.    Last office visit with prescriber/PCP: 5/3/2016 Tito Romero MD OR same dept: Visit date not found OR same specialty: 5/3/2016 Tito Romero MD  Last physical: 1/17/2019 Last MTM visit: Visit date not found   Next visit within 3 mo: Visit date not found  Next physical within 3 mo: Visit date not found  Prescriber OR PCP: Tito Romero MD  Last diagnosis associated with med order: 1. Esophageal reflux  - pantoprazole (PROTONIX) 40 MG tablet; Take 1 tablet (40 mg total) by mouth daily.  Dispense: 30 tablet; Refill: 1    If protocol passes may refill for 12 months if within 3 months of last provider visit (or a total of 15 months).

## 2021-06-24 NOTE — TELEPHONE ENCOUNTER
Refill Request  Did you contact pharmacy: Yes  Medication name:   pantoprazole (PROTONIX) 40 MG tablet  Who prescribed the medication: Guerda Santos MD  Pharmacy Name and Location: [facility] [address] [place] [address] [facility] [address] [facility]  Is patient out of medication: Yes  Patient notified refills processed in 72 hours:  yes  Okay to leave a detailed message: yes    Patient is requesting 90 days supply.

## 2021-06-24 NOTE — TELEPHONE ENCOUNTER
Medication Question or Clarification  Who is calling: Patient  What medication are you calling about?: clobetasol (TEMOVATE) 0.05 % ointment  What dose do you take?: N/A  How often are you taking the medication?: N/A  Who prescribed the medication?: Tito Romero MD  What is your question/concern?: Patient states that he had talked to Dr. Romero and there was another ointment that provider had recommended that is not as expensive as this one.  Patient did not remember what it was called but will like it sent over to pharmacy.  Please advise.  Pharmacy: [facility] - [place], [place] - [address] [place] [address] AT [address]  Okay to leave a detailed message?: Yes  Site CMT - Please call the pharmacy to obtain any additional needed information.

## 2021-08-19 ENCOUNTER — ANCILLARY PROCEDURE (OUTPATIENT)
Dept: ULTRASOUND IMAGING | Facility: CLINIC | Age: 41
End: 2021-08-19
Attending: EMERGENCY MEDICINE
Payer: COMMERCIAL

## 2021-08-19 ENCOUNTER — HOSPITAL ENCOUNTER (EMERGENCY)
Facility: CLINIC | Age: 41
Discharge: HOME | End: 2021-08-20
Attending: EMERGENCY MEDICINE | Admitting: EMERGENCY MEDICINE
Payer: COMMERCIAL

## 2021-08-19 ENCOUNTER — OFFICE VISIT (OUTPATIENT)
Dept: FAMILY MEDICINE | Facility: CLINIC | Age: 41
End: 2021-08-19
Payer: COMMERCIAL

## 2021-08-19 ENCOUNTER — HOSPITAL ENCOUNTER (EMERGENCY)
Facility: HOSPITAL | Age: 41
Discharge: HOME | End: 2021-08-19
Payer: COMMERCIAL

## 2021-08-19 VITALS
HEART RATE: 70 BPM | SYSTOLIC BLOOD PRESSURE: 127 MMHG | BODY MASS INDEX: 24.66 KG/M2 | OXYGEN SATURATION: 99 % | WEIGHT: 197.3 LBS | DIASTOLIC BLOOD PRESSURE: 85 MMHG | TEMPERATURE: 97.5 F | RESPIRATION RATE: 12 BRPM

## 2021-08-19 VITALS
OXYGEN SATURATION: 100 % | DIASTOLIC BLOOD PRESSURE: 76 MMHG | RESPIRATION RATE: 18 BRPM | TEMPERATURE: 97.7 F | BODY MASS INDEX: 24.62 KG/M2 | HEART RATE: 67 BPM | SYSTOLIC BLOOD PRESSURE: 137 MMHG | WEIGHT: 197 LBS

## 2021-08-19 DIAGNOSIS — K56.7 ILEUS OF UNSPECIFIED TYPE (H): ICD-10-CM

## 2021-08-19 DIAGNOSIS — D72.810 LYMPHOPENIA: ICD-10-CM

## 2021-08-19 DIAGNOSIS — R10.31 ABDOMINAL PAIN, ACUTE, RIGHT LOWER QUADRANT: Primary | ICD-10-CM

## 2021-08-19 DIAGNOSIS — Z11.52 ENCOUNTER FOR SCREENING LABORATORY TESTING FOR SEVERE ACUTE RESPIRATORY SYNDROME CORONAVIRUS 2 (SARS-COV-2): ICD-10-CM

## 2021-08-19 DIAGNOSIS — Z87.2 HISTORY OF PSORIATIC ARTHRITIS: ICD-10-CM

## 2021-08-19 DIAGNOSIS — R93.89 ABNORMAL CT SCAN: ICD-10-CM

## 2021-08-19 DIAGNOSIS — R10.84 ABDOMINAL PAIN, GENERALIZED: ICD-10-CM

## 2021-08-19 DIAGNOSIS — K56.609 SBO (SMALL BOWEL OBSTRUCTION) (H): ICD-10-CM

## 2021-08-19 LAB
ALBUMIN SERPL-MCNC: 3.3 G/DL (ref 3.4–5)
ALP SERPL-CCNC: 51 U/L (ref 40–150)
ALT SERPL W P-5'-P-CCNC: 24 U/L (ref 0–70)
ANION GAP SERPL CALCULATED.3IONS-SCNC: 6 MMOL/L (ref 3–14)
AST SERPL W P-5'-P-CCNC: 15 U/L (ref 0–45)
BASOPHILS # BLD AUTO: 0 10E3/UL (ref 0–0.2)
BASOPHILS # BLD AUTO: 0 10E3/UL (ref 0–0.2)
BASOPHILS NFR BLD AUTO: 0 %
BASOPHILS NFR BLD AUTO: 1 %
BILIRUB SERPL-MCNC: 1.1 MG/DL (ref 0.2–1.3)
BUN SERPL-MCNC: 14 MG/DL (ref 7–30)
CALCIUM SERPL-MCNC: 8.6 MG/DL (ref 8.5–10.1)
CHLORIDE BLD-SCNC: 100 MMOL/L (ref 94–109)
CO2 SERPL-SCNC: 33 MMOL/L (ref 20–32)
CREAT SERPL-MCNC: 0.89 MG/DL (ref 0.66–1.25)
EOSINOPHIL # BLD AUTO: 0.1 10E3/UL (ref 0–0.7)
EOSINOPHIL # BLD AUTO: 0.2 10E3/UL (ref 0–0.7)
EOSINOPHIL NFR BLD AUTO: 4 %
EOSINOPHIL NFR BLD AUTO: 4 %
ERYTHROCYTE [DISTWIDTH] IN BLOOD BY AUTOMATED COUNT: 11.6 % (ref 10–15)
ERYTHROCYTE [DISTWIDTH] IN BLOOD BY AUTOMATED COUNT: 11.6 % (ref 10–15)
GFR SERPL CREATININE-BSD FRML MDRD: >90 ML/MIN/1.73M2
GLUCOSE BLD-MCNC: 99 MG/DL (ref 70–99)
HCT VFR BLD AUTO: 43 % (ref 40–53)
HCT VFR BLD AUTO: 44.2 % (ref 40–53)
HGB BLD-MCNC: 14.7 G/DL (ref 13.3–17.7)
HGB BLD-MCNC: 15 G/DL (ref 13.3–17.7)
HOLD SPECIMEN: NORMAL
IMM GRANULOCYTES # BLD: 0 10E3/UL
IMM GRANULOCYTES # BLD: 0 10E3/UL
IMM GRANULOCYTES NFR BLD: 0 %
IMM GRANULOCYTES NFR BLD: 0 %
LYMPHOCYTES # BLD AUTO: 0.8 10E3/UL (ref 0.8–5.3)
LYMPHOCYTES # BLD AUTO: 1.1 10E3/UL (ref 0.8–5.3)
LYMPHOCYTES NFR BLD AUTO: 24 %
LYMPHOCYTES NFR BLD AUTO: 28 %
MCH RBC QN AUTO: 28.4 PG (ref 26.5–33)
MCH RBC QN AUTO: 28.8 PG (ref 26.5–33)
MCHC RBC AUTO-ENTMCNC: 33.9 G/DL (ref 31.5–36.5)
MCHC RBC AUTO-ENTMCNC: 34.2 G/DL (ref 31.5–36.5)
MCV RBC AUTO: 83 FL (ref 78–100)
MCV RBC AUTO: 85 FL (ref 78–100)
MONOCYTES # BLD AUTO: 0.4 10E3/UL (ref 0–1.3)
MONOCYTES # BLD AUTO: 0.4 10E3/UL (ref 0–1.3)
MONOCYTES NFR BLD AUTO: 12 %
MONOCYTES NFR BLD AUTO: 13 %
NEUTROPHILS # BLD AUTO: 2 10E3/UL (ref 1.6–8.3)
NEUTROPHILS # BLD AUTO: 2.1 10E3/UL (ref 1.6–8.3)
NEUTROPHILS NFR BLD AUTO: 55 %
NEUTROPHILS NFR BLD AUTO: 59 %
NRBC # BLD AUTO: 0 10E3/UL
NRBC BLD AUTO-RTO: 0 /100
PLATELET # BLD AUTO: 245 10E3/UL (ref 150–450)
PLATELET # BLD AUTO: 261 10E3/UL (ref 150–450)
POTASSIUM BLD-SCNC: 3.8 MMOL/L (ref 3.4–5.3)
PROT SERPL-MCNC: 6.7 G/DL (ref 6.8–8.8)
RBC # BLD AUTO: 5.17 10E6/UL (ref 4.4–5.9)
RBC # BLD AUTO: 5.21 10E6/UL (ref 4.4–5.9)
SODIUM SERPL-SCNC: 139 MMOL/L (ref 133–144)
WBC # BLD AUTO: 3.4 10E3/UL (ref 4–11)
WBC # BLD AUTO: 3.8 10E3/UL (ref 4–11)

## 2021-08-19 PROCEDURE — 36415 COLL VENOUS BLD VENIPUNCTURE: CPT | Performed by: EMERGENCY MEDICINE

## 2021-08-19 PROCEDURE — 76705 ECHO EXAM OF ABDOMEN: CPT | Performed by: EMERGENCY MEDICINE

## 2021-08-19 PROCEDURE — C9803 HOPD COVID-19 SPEC COLLECT: HCPCS | Performed by: EMERGENCY MEDICINE

## 2021-08-19 PROCEDURE — 76705 ECHO EXAM OF ABDOMEN: CPT | Mod: 26 | Performed by: EMERGENCY MEDICINE

## 2021-08-19 PROCEDURE — 85025 COMPLETE CBC W/AUTO DIFF WBC: CPT | Performed by: EMERGENCY MEDICINE

## 2021-08-19 PROCEDURE — 99285 EMERGENCY DEPT VISIT HI MDM: CPT | Mod: 25 | Performed by: EMERGENCY MEDICINE

## 2021-08-19 PROCEDURE — 36415 COLL VENOUS BLD VENIPUNCTURE: CPT | Performed by: PHYSICIAN ASSISTANT

## 2021-08-19 PROCEDURE — 258N000003 HC RX IP 258 OP 636: Performed by: EMERGENCY MEDICINE

## 2021-08-19 PROCEDURE — 99215 OFFICE O/P EST HI 40 MIN: CPT | Performed by: PHYSICIAN ASSISTANT

## 2021-08-19 PROCEDURE — 85025 COMPLETE CBC W/AUTO DIFF WBC: CPT | Performed by: PHYSICIAN ASSISTANT

## 2021-08-19 PROCEDURE — 82040 ASSAY OF SERUM ALBUMIN: CPT | Performed by: EMERGENCY MEDICINE

## 2021-08-19 PROCEDURE — 80053 COMPREHEN METABOLIC PANEL: CPT | Performed by: EMERGENCY MEDICINE

## 2021-08-19 PROCEDURE — 96360 HYDRATION IV INFUSION INIT: CPT | Performed by: EMERGENCY MEDICINE

## 2021-08-19 RX ORDER — PANTOPRAZOLE SODIUM 40 MG/1
40 TABLET, DELAYED RELEASE ORAL DAILY
COMMUNITY
Start: 2021-05-26 | End: 2022-03-01

## 2021-08-19 RX ORDER — HYDROXYCHLOROQUINE SULFATE 200 MG/1
TABLET, FILM COATED ORAL
COMMUNITY
Start: 2021-05-26

## 2021-08-19 RX ADMIN — SODIUM CHLORIDE 1000 ML: 9 INJECTION, SOLUTION INTRAVENOUS at 19:07

## 2021-08-19 NOTE — PROGRESS NOTES
Patient presents with:  Abdominal Pain: Cramping x4 days (making it hard to eat)       Clinical Decision Making:  I spoke the radiologist regarding the findings on the CT of the abdomen.  Multiple etiologies and diagnoses were considered based on the finding of the CT scan.  The conversation with the radiologist did show that it could be early ileus from a viral enteritis, there were no noted masses or obstruction there was possible irritable bowel syndrome from a stricture and there was small bowel dilation and air-fluid levels were noted.  Because the patient is sick and he is having a 4 to 5-day history of feeling poorly, he did have a early nausea and vomiting on the first day of his 5-day history of illness.  He still passing flatus by rectum and had a very small bowel movement this morning I do not believe that there is a small bowel obstruction but it could be early ileus or early bowel obstruction.  Patient has been directed to the emergency room for consultation with general surgery and further work-up.  Possible concern would be small bowel follow-through or reimaging with x-ray looking for obstruction.  Consult [facility] with general surgery and consideration for admission to the floor for observation would also be a consideration.  I gave report to the emergency room staff and patient is sent over by personal vehicle for hydration and treatment.    40 min spent on the date of the encounter in chart review, patient visit, review of tests, documentation and/ordiscussion with other providers about the issues documented above.       ICD-10-CM    1. Abdominal pain, acute, right lower quadrant  R10.31 CBC with platelets and differential     CT Abdomen Pelvis w Contrast   2. Ileus of unspecified type (H)  K56.7    3. Lymphopenia  D72.810        Patient Instructions     Do not eat or drink anything by mouth.  To the emergency room for definitive evaluation and treatment for possible ileus or early small bowel  obstruction.          Patient Education     Small Bowel Obstruction  A small bowel obstruction occurs when part or all of the small intestine (bowel) is blocked. As a result, digestive contents can t move through the bowel and out of the body correctly. Treatment is needed right away to remove the blockage. This can ease painful symptoms. It can also prevent serious problems, such as tissue death or bursting (rupture) of the small bowel. Without treatment, a small bowel obstruction can be fatal.      Small bowel obstruction can lead to tissue damage and even tissue death.   Causes of small bowel obstruction  A small bowel obstruction can be caused by:     Scar tissue (adhesions).  These may form after belly (abdominal) surgery or an infection.    Hernia. A hernia is when an organ pushes through a weak spot or tear in the abdomen wall. Part of the small bowel can push out and be seen as a bulge under the belly. Hernias can also occur internally.    Certain health problems.  These include when part of the bowel slides inside another part (intussusception). Other causes include irritable bowel disease such as Crohn s disease, and inflammation and sores in the intestine (ulcerative colitis).    Abnormal tissue growths (tumors).  These can form on the inside or outside of the small bowel. They are usually due to cancer.  Symptoms of small bowel obstruction   Common symptoms include:    Belly cramping and pain    Belly swelling and bloating    Upset stomach (nausea) and vomiting    Can't  pass gas    Can't pass stool (constipation)    Diarrhea  Diagnosing small bowel obstruction  Your provider will ask about your symptoms and health history. You ll also have a physical exam. Tests may also be done to confirm the problem. These can include:     Imaging tests. These provide pictures of the small bowel. Common tests include X-rays and a CT scan.    Blood tests. These check for infection and other problems, such as excess  fluid loss (dehydration).    Upper GI (gastrointestinal) series with a small bowel follow-through.  This test takes X-rays of the upper digestive tract from the mouth through the small bowel. An X-ray dye (contrast fluid) is used. The dye coats the inside of your upper digestive tract so it will show up clearly on X-rays.  Treating small bowel obstruction  Treatment takes place in a hospital. As part of your care, the following may be done:     No food or drink is given by mouth. This allows your bowels to rest.    An IV (intravenous) line is placed in a vein in your arm or hand. The IV line is used to give fluids. It may also be used to give medicines. These may be needed to ease pain, nausea, and other symptoms. They may also be needed to treat or prevent infections.    A soft, thin, flexible tube (nasogastric tube) is inserted through your nose and into your stomach. The tube is used to remove extra gas and fluid in your stomach and bowels. This helps to ease symptoms such as pain and swelling.    In some cases, surgery is done. This may be needed if the small bowel is almost or totally blocked, if symptoms continue even after treatment, or if there is a hole in the bowel (bowel perforation). During surgery, the blockage is removed. Parts of the bowel may also be removed if there is tissue death. Other repair may be done as well, depending on what caused the blockage. Your healthcare provider will give you more information about surgery, if needed.    You ll be watched closely in the hospital until your symptoms improve. Your provider will tell you when you can go home.  Long-term concerns   After treatment, most people recover with no lasting effects. If a long part of the bowel is removed, there is a greater chance for lifelong digestive problems. Bowel movements may become irregular. Work with your provider to learn the best ways to manage any symptoms you may have, and to protect your health.   When to call  your healthcare provider  Call your provider right away if you have any of the following:     Severe pain (call 911)    Belly swelling or cramping that won t go away    Can t pass stool or gas    Nausea or vomiting (especially if the vomit looks or smells like stool)  [place] last reviewed this educational content on 6/1/2019 2000-2021 [employer]. All rights reserved. This information is not intended as a substitute for professional medical care. Always follow your healthcare professional's instructions.           Patient Education     Ileus  Ileus occurs when there is a problem with motility in the stomach and small or large intestine (bowel). Motility is the movement of food and waste through the digestive tract. Ileus is not caused by a physical blockage (obstruction).     Normally, muscles in the bowel walls squeeze (contract) to move waste along. Signals from nerves tell the muscles when to contract. With ileus, this movement slows down or stops completely. As a result, waste can t move through the bowels and out of the body. This can cause belly (abdominal) pain and other symptoms. Treatment is needed to restore normal movement and ease symptoms.      The digestive tract.   Causes of ileus  Ileus can be caused by the following:     Abdominal surgery    Abdominal infection    Injury to blood vessels that supply blood to the abdomen    Low levels of sodium or potassium (electrolyte imbalance)    Certain medicines, such as opioid pain medicines    Certain kidney or lung diseases    Certain health problems, such as cystic fibrosis and diabetes   Symptoms of ileus  Common symptoms of ileus include:     Belly swelling or bloating    Upset stomach (nausea) and vomiting    Belly cramps    Constipation or diarrhea     Loss of appetite    Not able to keep food down    Not able to pass stool or gas  Diagnosing ileus  Your healthcare provider will ask about your symptoms and health history. You ll also  have a physical exam. If your provider thinks you may have ileus, tests may be done to confirm the problem. These can include:     Imaging tests. These provide pictures of the bowels. Common tests include X-rays and a CT scan.    Blood tests. These are done to check for infection and other problems, such as fluid loss (dehydration).    Upper GI (gastrointestinal) series.  This test takes X-rays of the upper digestive tract, from the mouth to the small intestine. An X-ray dye (contrast fluid) is used. The dye coats the inside of the upper digestive tract. This makes it show up clearly on X-rays.  Treating ileus  In most cases, ileus goes away by itself when the main cause clears up. The goal is to manage symptoms until movement in the digestive tract returns to normal. Treatment takes place in a hospital. As part of your care, the following may be done:     No food or drink is given by mouth. This allows your bowels to rest.    An IV (intravenous) line is placed in a vein in your arm or hand. The IV line is used to give fluids and nutrition. It may also be used to give medicines. These may be needed to improve movement in your digestive tract, or to ease pain. They may also be needed to treat any underlying infections or conditions you have.    A soft, thin, flexible tube (nasogastric tube) is inserted through your nose and into your stomach. The tube is used to remove extra gas and fluid in your stomach and bowels. This helps to ease symptoms such as pain and swelling.    You ll be watched closely in the hospital until your symptoms get better. Your provider will tell you when you re well enough to go home. This is often in a few days.    In rare cases, problems may occur. Other treatments, such as surgery, may then be done. Your provider will tell you more about other treatments, if needed.  Long-term concerns   After treatment, most people fully recover. In some cases, you may need to see your provider for a  follow-up appointment.   When to call the healthcare provider  Call your healthcare provider right away if you have any of the following:     Fever of 100.4 F (38 C) or higher, or as advised by your provider    Chills    Belly swelling or pain that won t go away    Not able to pass stool or gas    Upset stomach and vomiting    Getting full very easily with only small amounts of food or drink    Bleeding from the rectum    Black, tarry stool  [place] last reviewed this educational content on 6/1/2019 2000-2021 [employer]. All rights reserved. This information is not intended as a substitute for professional medical care. Always follow your healthcare professional's instructions.               HPI:  Price Gray is a 41 year old male who presents today for a 4-day history of right lower quadrant abdominal pain.  Patient has increased pain when he moves or changes position or walks or goes over a bump or strikes his heel.  She has had anorexia bloating increased burping and farting and also early satiety.  Patient had 1 bout of emesis on Sunday, he has not had fever chills or night sweats no continued diarrhea with 1 bout of loose watery stools on Sunday.  Has not had any urinary symptoms, no cough skin rash or other symptoms to report.  Is not tried treatment for this at home.  Describes abdominal pain as a 7 or 8 out of 10 and is constant.    History obtained from chart review and the patient.    Problem List:  Esophageal Reflux  Polyarthritis  Pneumonia  Cough  Dizziness  Leukopenia      No past medical history on file.    Social History     Tobacco Use     Smoking status: Former Smoker     Types: Dip, chew, snus or snuff     Smokeless tobacco: Current User     Types: Chew, Chew   Substance Use Topics     Alcohol use: Yes     Comment: Alcoholic Drinks/day: Rare       Review of Systems  As above in HPI otherwise negative.    Vitals:    08/19/21 1010   BP: 127/85   BP Location: Right arm    Patient Position: Sitting   Cuff Size: Adult Regular   Pulse: 70   Resp: 12   Temp: 97.5  F (36.4  C)   TempSrc: Oral   SpO2: 99%   Weight: 89.5 kg (197 lb 4.8 oz)     General: Patient is uncomfortable in the office but is no acute distress is afebrile  Does ambulate into the office encounter.  He does have pain with heel strike and movement.  HEENT: Head is normocephalic atraumatic   eyes are PERRL EOMI sclera anicteric   TMs are clear bilaterally  Throat is clear  and no exudate  No cervical lymphadenopathy present  LUNGS: Clear to auscultation bilaterally  HEART: Regular rate and rhythm  Abdomen: Patient has pain at McBurney's point which does reproduce his pain.  He has slight rebound and he does have peritoneal signs on the right lower quadrant.  Right bowel sounds in all 4 quadrants of the abdomen.  Skin: Without rash non-diaphoretic    Physical Exam    Labs:  Results for orders placed or performed in visit on 08/19/21   CT Abdomen Pelvis w Contrast     Status: None    Narrative    EXAM DATE:         08/19/2021    EXAM: CT ABDOMEN, PELVIS WITH CONTRAST  LOCATION: [facility]  DATE/TIME: 8/19/2021 10:45 AM    INDICATION: Right lower quadrant abdominal pain; rule out appendicitis.  COMPARISON: None.  TECHNIQUE: CT scan of the abdomen and pelvis was performed following injection of IV contrast. Multiplanar reformats were obtained. Dose reduction techniques were used.  CONTRAST: 100ml of isovue 370 was administered. SPR iSTAT CR=1.2 mg/dl, eGFR=66.    FINDINGS:  LOWER CHEST: Normal.    HEPATOBILIARY: Normal.    PANCREAS: Normal.    SPLEEN: Normal.    ADRENAL GLANDS: Normal.    KIDNEYS/BLADDER: Normal.    BOWEL: No free air. Stomach is decompressed. Proximal small bowel is not distended. The mid to distal small bowel is distended with transition in the presacral region of the pelvis. The terminal ileum is decompressed and appears normal. Small amount of   stool and gas throughout  the colon. Small amount of free fluid in the pelvis. No organized abscess. No pneumatosis. Normal appendix.    LYMPH NODES: Normal.    VASCULATURE: Unremarkable.    PELVIC ORGANS: Normal.    MUSCULOSKELETAL: Normal.    IMPRESSION:  1.  Abnormal small bowel dilatation with transition in the pelvis without identifiable cause for the change in caliber. Small amount of free fluid in the pelvis. No organized fluid collection. No pneumatosis or pneumoperitoneum. Findings could represent   a small bowel obstruction or ileus related to infection. Correlate with surgical history and signs and symptoms of inflammatory bowel disease versus infection.  2.  Normal appendix.    Report was discussed with Albino Sepulveda, Physician Assistant at 1145 hours on August 19, 2021.             CBC with platelets and differential     Status: Abnormal    Narrative    The following orders were created for panel order CBC with platelets and differential.  Procedure                               Abnormality         Status                     ---------                               -----------         ------                     CBC with platelets and d...[[id number]]  Abnormal            Final result                 Please view results for these tests on the individual orders.   CBC with platelets and differential     Status: Abnormal   Result Value Ref Range    WBC Count 3.4 (L) 4.0 - 11.0 10e3/uL    RBC Count 5.17 4.40 - 5.90 10e6/uL    Hemoglobin 14.7 13.3 - 17.7 g/dL    Hematocrit 43.0 40.0 - 53.0 %    MCV 83 78 - 100 fL    MCH 28.4 26.5 - 33.0 pg    MCHC 34.2 31.5 - 36.5 g/dL    RDW 11.6 10.0 - 15.0 %    Platelet Count 261 150 - 450 10e3/uL    % Neutrophils 59 %    % Lymphocytes 24 %    % Monocytes 13 %    % Eosinophils 4 %    % Basophils 0 %    % Immature Granulocytes 0 %    Absolute Neutrophils 2.0 1.6 - 8.3 10e3/uL    Absolute Lymphocytes 0.8 0.8 - 5.3 10e3/uL    Absolute Monocytes 0.4 0.0 - 1.3 10e3/uL    Absolute Eosinophils 0.1 0.0 - 0.7  10e3/uL    Absolute Basophils 0.0 0.0 - 0.2 10e3/uL    Absolute Immature Granulocytes 0.0 <=0.0 10e3/uL

## 2021-08-19 NOTE — ED TRIAGE NOTES
Seen at , then went to ED at [facility] but left without being seen  Drove self to ED    Note from clinic: Multiple etiologies and diagnoses were considered based on the finding of the CT scan.  The conversation with the radiologist did show that it could be early ileus from a viral enteritis, there were no noted masses or obstruction there was possible irritable bowel syndrome from a stricture and there was small bowel dilation and air-fluid levels were noted.  Because the patient is sick and he is having a 4 to 5-day history of feeling poorly, he did have a early nausea and vomiting on the first day of his 5-day history of illness.  He still passing flatus by rectum and had a very small bowel movement this morning I do not believe that there is a small bowel obstruction but it could be early ileus or early bowel obstruction.  Patient has been directed to the emergency room for consultation with general surgery and further work-up.  Possible concern would be small bowel follow-through or reimaging with x-ray looking for obstruction.  Consult [facility] with general surgery and consideration for admission to the floor for observation would also be a consideration.

## 2021-08-19 NOTE — PATIENT INSTRUCTIONS
Do not eat or drink anything by mouth.  To the emergency room for definitive evaluation and treatment for possible ileus or early small bowel obstruction.          Patient Education     Small Bowel Obstruction  A small bowel obstruction occurs when part or all of the small intestine (bowel) is blocked. As a result, digestive contents can t move through the bowel and out of the body correctly. Treatment is needed right away to remove the blockage. This can ease painful symptoms. It can also prevent serious problems, such as tissue death or bursting (rupture) of the small bowel. Without treatment, a small bowel obstruction can be fatal.      Small bowel obstruction can lead to tissue damage and even tissue death.   Causes of small bowel obstruction  A small bowel obstruction can be caused by:     Scar tissue (adhesions).  These may form after belly (abdominal) surgery or an infection.    Hernia. A hernia is when an organ pushes through a weak spot or tear in the abdomen wall. Part of the small bowel can push out and be seen as a bulge under the belly. Hernias can also occur internally.    Certain health problems.  These include when part of the bowel slides inside another part (intussusception). Other causes include irritable bowel disease such as Crohn s disease, and inflammation and sores in the intestine (ulcerative colitis).    Abnormal tissue growths (tumors).  These can form on the inside or outside of the small bowel. They are usually due to cancer.  Symptoms of small bowel obstruction   Common symptoms include:    Belly cramping and pain    Belly swelling and bloating    Upset stomach (nausea) and vomiting    Can't  pass gas    Can't pass stool (constipation)    Diarrhea  Diagnosing small bowel obstruction  Your provider will ask about your symptoms and health history. You ll also have a physical exam. Tests may also be done to confirm the problem. These can include:     Imaging tests. These provide pictures  of the small bowel. Common tests include X-rays and a CT scan.    Blood tests. These check for infection and other problems, such as excess fluid loss (dehydration).    Upper GI (gastrointestinal) series with a small bowel follow-through.  This test takes X-rays of the upper digestive tract from the mouth through the small bowel. An X-ray dye (contrast fluid) is used. The dye coats the inside of your upper digestive tract so it will show up clearly on X-rays.  Treating small bowel obstruction  Treatment takes place in a hospital. As part of your care, the following may be done:     No food or drink is given by mouth. This allows your bowels to rest.    An IV (intravenous) line is placed in a vein in your arm or hand. The IV line is used to give fluids. It may also be used to give medicines. These may be needed to ease pain, nausea, and other symptoms. They may also be needed to treat or prevent infections.    A soft, thin, flexible tube (nasogastric tube) is inserted through your nose and into your stomach. The tube is used to remove extra gas and fluid in your stomach and bowels. This helps to ease symptoms such as pain and swelling.    In some cases, surgery is done. This may be needed if the small bowel is almost or totally blocked, if symptoms continue even after treatment, or if there is a hole in the bowel (bowel perforation). During surgery, the blockage is removed. Parts of the bowel may also be removed if there is tissue death. Other repair may be done as well, depending on what caused the blockage. Your healthcare provider will give you more information about surgery, if needed.    You ll be watched closely in the hospital until your symptoms improve. Your provider will tell you when you can go home.  Long-term concerns   After treatment, most people recover with no lasting effects. If a long part of the bowel is removed, there is a greater chance for lifelong digestive problems. Bowel movements may become  irregular. Work with your provider to learn the best ways to manage any symptoms you may have, and to protect your health.   When to call your healthcare provider  Call your provider right away if you have any of the following:     Severe pain (call 911)    Belly swelling or cramping that won t go away    Can t pass stool or gas    Nausea or vomiting (especially if the vomit looks or smells like stool)  [email or web address] last reviewed this educational content on 6/1/2019 2000-2021 [employer]. All rights reserved. This information is not intended as a substitute for professional medical care. Always follow your healthcare professional's instructions.           Patient Education     Ileus  Ileus occurs when there is a problem with motility in the stomach and small or large intestine (bowel). Motility is the movement of food and waste through the digestive tract. Ileus is not caused by a physical blockage (obstruction).     Normally, muscles in the bowel walls squeeze (contract) to move waste along. Signals from nerves tell the muscles when to contract. With ileus, this movement slows down or stops completely. As a result, waste can t move through the bowels and out of the body. This can cause belly (abdominal) pain and other symptoms. Treatment is needed to restore normal movement and ease symptoms.      The digestive tract.   Causes of ileus  Ileus can be caused by the following:     Abdominal surgery    Abdominal infection    Injury to blood vessels that supply blood to the abdomen    Low levels of sodium or potassium (electrolyte imbalance)    Certain medicines, such as opioid pain medicines    Certain kidney or lung diseases    Certain health problems, such as cystic fibrosis and diabetes   Symptoms of ileus  Common symptoms of ileus include:     Belly swelling or bloating    Upset stomach (nausea) and vomiting    Belly cramps    Constipation or diarrhea     Loss of appetite    Not able to keep food  down    Not able to pass stool or gas  Diagnosing ileus  Your healthcare provider will ask about your symptoms and health history. You ll also have a physical exam. If your provider thinks you may have ileus, tests may be done to confirm the problem. These can include:     Imaging tests. These provide pictures of the bowels. Common tests include X-rays and a CT scan.    Blood tests. These are done to check for infection and other problems, such as fluid loss (dehydration).    Upper GI (gastrointestinal) series.  This test takes X-rays of the upper digestive tract, from the mouth to the small intestine. An X-ray dye (contrast fluid) is used. The dye coats the inside of the upper digestive tract. This makes it show up clearly on X-rays.  Treating ileus  In most cases, ileus goes away by itself when the main cause clears up. The goal is to manage symptoms until movement in the digestive tract returns to normal. Treatment takes place in a hospital. As part of your care, the following may be done:     No food or drink is given by mouth. This allows your bowels to rest.    An IV (intravenous) line is placed in a vein in your arm or hand. The IV line is used to give fluids and nutrition. It may also be used to give medicines. These may be needed to improve movement in your digestive tract, or to ease pain. They may also be needed to treat any underlying infections or conditions you have.    A soft, thin, flexible tube (nasogastric tube) is inserted through your nose and into your stomach. The tube is used to remove extra gas and fluid in your stomach and bowels. This helps to ease symptoms such as pain and swelling.    You ll be watched closely in the hospital until your symptoms get better. Your provider will tell you when you re well enough to go home. This is often in a few days.    In rare cases, problems may occur. Other treatments, such as surgery, may then be done. Your provider will tell you more about other  treatments, if needed.  Long-term concerns   After treatment, most people fully recover. In some cases, you may need to see your provider for a follow-up appointment.   When to call the healthcare provider  Call your healthcare provider right away if you have any of the following:     Fever of 100.4 F (38 C) or higher, or as advised by your provider    Chills    Belly swelling or pain that won t go away    Not able to pass stool or gas    Upset stomach and vomiting    Getting full very easily with only small amounts of food or drink    Bleeding from the rectum    Black, tarry stool  [employer] last reviewed this educational content on 6/1/2019 2000-2021 [employer]. All rights reserved. This information is not intended as a substitute for professional medical care. Always follow your healthcare professional's instructions.

## 2021-08-19 NOTE — ED PROVIDER NOTES
Expected Patient Referral to ED  12:29 PM    Referring Clinic/Provider:  Urgent care    Reason for referral/Clinical facts:  RLQ pain, CT with ileus vs early obstruction, patient passing gas, very symptomatic    Recommendations provided:  Send to ED    Caller was informed that this institution does  possess the capabilities and/or resources to provide for patient and should be transferred to our institution.    Based on the information provided, discussed that this patient likely is nota good candidate for direct admission to this institution and that provider could proceed as such.  If however direct admit is sought and any hurdles encountered, this ED would be happy to see the patient and evaluate.    Informed caller that recommendations provided are recommendations based only on the facts provided and that they responsible to accept or reject the advice, or to seek a formal in person consultation as needed and that this ED will see/treat patient should they arrive.      Jennifer Gary DO  Emergency Medicine  [facility] EMERGENCY DEPARTMENT  [address] [place] [id number]  [phone]     Jennifer Gary DO  08/19/21 1231

## 2021-08-20 VITALS
HEART RATE: 59 BPM | RESPIRATION RATE: 16 BRPM | SYSTOLIC BLOOD PRESSURE: 125 MMHG | BODY MASS INDEX: 24.5 KG/M2 | WEIGHT: 196 LBS | DIASTOLIC BLOOD PRESSURE: 89 MMHG | OXYGEN SATURATION: 99 % | TEMPERATURE: 98.4 F

## 2021-08-20 PROBLEM — R10.84 ABDOMINAL PAIN, GENERALIZED: Status: ACTIVE | Noted: 2021-08-20

## 2021-08-20 PROBLEM — K56.609 SBO (SMALL BOWEL OBSTRUCTION) (H): Status: ACTIVE | Noted: 2021-08-20

## 2021-08-20 LAB — SARS-COV-2 RNA RESP QL NAA+PROBE: NEGATIVE

## 2021-08-20 PROCEDURE — 258N000003 HC RX IP 258 OP 636: Performed by: EMERGENCY MEDICINE

## 2021-08-20 PROCEDURE — 87635 SARS-COV-2 COVID-19 AMP PRB: CPT | Performed by: EMERGENCY MEDICINE

## 2021-08-20 PROCEDURE — 96361 HYDRATE IV INFUSION ADD-ON: CPT | Performed by: EMERGENCY MEDICINE

## 2021-08-20 RX ORDER — ACETAMINOPHEN 325 MG/1
650 TABLET ORAL EVERY 4 HOURS PRN
Status: CANCELLED | OUTPATIENT
Start: 2021-08-20

## 2021-08-20 RX ORDER — SODIUM CHLORIDE, SODIUM LACTATE, POTASSIUM CHLORIDE, CALCIUM CHLORIDE 600; 310; 30; 20 MG/100ML; MG/100ML; MG/100ML; MG/100ML
INJECTION, SOLUTION INTRAVENOUS CONTINUOUS
Status: DISCONTINUED | OUTPATIENT
Start: 2021-08-20 | End: 2021-08-20 | Stop reason: HOSPADM

## 2021-08-20 RX ORDER — ONDANSETRON 2 MG/ML
4 INJECTION INTRAMUSCULAR; INTRAVENOUS EVERY 6 HOURS PRN
Status: CANCELLED | OUTPATIENT
Start: 2021-08-20

## 2021-08-20 RX ORDER — ONDANSETRON 4 MG/1
4 TABLET, ORALLY DISINTEGRATING ORAL EVERY 6 HOURS PRN
Status: CANCELLED | OUTPATIENT
Start: 2021-08-20

## 2021-08-20 RX ADMIN — SODIUM CHLORIDE, POTASSIUM CHLORIDE, SODIUM LACTATE AND CALCIUM CHLORIDE: 600; 310; 30; 20 INJECTION, SOLUTION INTRAVENOUS at 00:28

## 2021-08-20 RX ADMIN — SODIUM CHLORIDE, POTASSIUM CHLORIDE, SODIUM LACTATE AND CALCIUM CHLORIDE: 600; 310; 30; 20 INJECTION, SOLUTION INTRAVENOUS at 11:59

## 2021-08-20 NOTE — ED PROVIDER NOTES
History     Chief Complaint   Patient presents with     Abdominal Pain     HPI  Price Gray is a 41 year old male who presents for abdominal pain.  Symptoms ongoing for the past 6 days, waxing and waning.  Pain is cramping, diffuse, hurts to eat or drink.  No fevers or chills.  Occasional nausea and vomiting.  He has been passing some gas but has been having very few bowel movements.  No prior abdominal surgeries.  He denies headache, fever, chills, chest pain, shortness of breath, cough, dysuria, urine frequency, or rash.  He was seen in urgent care earlier in the day and had a CT scan of his abdomen/pelvis that was concerning for small bowel obstruction and was sent here for further treatment.    Allergies:  No Known Allergies    Problem List:    Patient Active Problem List    Diagnosis Date Noted     Leukopenia      Priority: Medium     Created by Conversion  Replacement Utility updated for latest IMO load         Esophageal Reflux      Priority: Medium     Created by Conversion         Polyarthritis      Priority: Medium     Created by Conversion         Dizziness      Priority: Medium     Created by Conversion         Pneumonia      Priority: Medium     Created by Conversion         Cough      Priority: Medium     Created by Conversion            Past Medical History:    No past medical history on file.    Past Surgical History:    Past Surgical History:   Procedure Laterality Date      KNEE SCOPE, DIAGNOSTIC      Description: Arthroscopy Knee Left;  Recorded: 08/09/2010;  Comments: 2000, meniscal repair      KNEE SCOPE, DIAGNOSTIC      Description: Arthroscopy Knee Right;  Recorded: 08/09/2010;  Comments: 1997, meniscal repair     ORTHOPEDIC SURGERY      knees     REMOVAL OF SPERM DUCT(S)      Description: Surgery Of Male Genitalia Vasectomy;  Recorded: 09/24/2010;  Comments: 9/2010       Family History:    Family History   Problem Relation Age of Onset     Hypertension Father      Coronary  Artery Disease Maternal Grandmother        Social History:  Marital Status:   [2]  Social History     Tobacco Use     Smoking status: Former Smoker     Types: Dip, chew, snus or snuff     Smokeless tobacco: Current User     Types: Chew, Chew   Substance Use Topics     Alcohol use: Yes     Comment: Alcoholic Drinks/day: Rare     Drug use: Not on file        Medications:    amoxicillin-clavulanate (AUGMENTIN) 875-125 MG per tablet  CLOBETASOL & CLOBETASOL EMUL EX  fluticasone (FLONASE) 50 MCG/ACT spray  hydroxychloroquine (PLAQUENIL) 200 MG tablet  Hydroxychloroquine Sulfate (PLAQUENIL PO)  pantoprazole (PROTONIX) 40 MG EC tablet  Pantoprazole Sodium (PROTONIX PO)          Review of Systems  Pertinent positives and negatives listed in the HPI, all other systems reviewed and are negative.    Physical Exam   BP: (!) 150/78  Pulse: 66  Temp: 97.3  F (36.3  C)  Resp: 16  Weight: 88.9 kg (196 lb)  SpO2: 100 %      Physical Exam  Vitals and nursing note reviewed.   Constitutional:       General: He is in acute distress.      Appearance: He is well-developed. He is not diaphoretic.   HENT:      Head: Normocephalic and atraumatic.      Right Ear: External ear normal.      Left Ear: External ear normal.      Nose: Nose normal.   Eyes:      General: No scleral icterus.     Conjunctiva/sclera: Conjunctivae normal.   Cardiovascular:      Rate and Rhythm: Normal rate and regular rhythm.   Pulmonary:      Effort: Pulmonary effort is normal. No respiratory distress.      Breath sounds: No stridor.   Abdominal:      General: There is no distension.      Palpations: Abdomen is soft.      Tenderness: There is generalized abdominal tenderness. There is no guarding or rebound.   Musculoskeletal:      Cervical back: Normal range of motion.   Skin:     General: Skin is warm and dry.   Neurological:      Mental Status: He is alert and oriented to person, place, and time.   Psychiatric:         Behavior: Behavior normal.         ED  Course        Procedures    Results for orders placed during the hospital encounter of 08/19/21    POC US ABDOMEN LIMITED    [facility] Procedure Note    Limited Bedside ED Bowel Ultrasound:    PROCEDURE: PERFORMED BY: Dr. Korey Joseph  INDICATIONS:  Abdominal Pain  PROBE:  Low frequency convex probe and high-frequency linear probe  BODY LOCATION: Abdomen  FINDINGS:   An ultrasound of the bowel was performed using longitudinal and transverse views within all 4 quadrants.  Dilated loops of bowel with abnormal peristalsis  INTERPRETATION: Ultrasound of the bowel consistent with small bowel obstruction  IMAGE DOCUMENTATION: Images were archived to PACs system.            Critical Care time:  none               Results for orders placed or performed during the hospital encounter of 08/19/21 (from the past 24 hour(s))   [facility] Draw    Narrative    The following orders were created for panel order [facility] Draw.  Procedure                               Abnormality         Status                     ---------                               -----------         ------                     Extra Blue Top Tube[[id number]]                              Final result               Extra Red Top Tube[[id number]]                               Final result               Extra Green Top (Lithium...[[id number]]                      Final result               Extra Green Top (Lithium...[[id number]]                      Final result               Extra Purple Top Tube[[id number]]                            Final result                 Please view results for these tests on the individual orders.   Extra Blue Top Tube   Result Value Ref Range    Hold Specimen [facility]    Extra Red Top Tube   Result Value Ref Range    Hold Specimen [facility]    Extra Green Top (Lithium Heparin) Tube   Result Value Ref Range    Hold Specimen [facility]    Extra Green Top (Lithium Heparin) Tube   Result Value Ref Range    Hold Specimen [facility]    Extra Purple  Top Tube   Result Value Ref Range    Hold Specimen [facility]    CBC with platelets, differential    Narrative    The following orders were created for panel order CBC with platelets, differential.  Procedure                               Abnormality         Status                     ---------                               -----------         ------                     CBC with platelets and d...[[id number]]  Abnormal            Final result                 Please view results for these tests on the individual orders.   Comprehensive metabolic panel   Result Value Ref Range    Sodium 139 133 - 144 mmol/L    Potassium 3.8 3.4 - 5.3 mmol/L    Chloride 100 94 - 109 mmol/L    Carbon Dioxide (CO2) 33 (H) 20 - 32 mmol/L    Anion Gap 6 3 - 14 mmol/L    Urea Nitrogen 14 7 - 30 mg/dL    Creatinine 0.89 0.66 - 1.25 mg/dL    Calcium 8.6 8.5 - 10.1 mg/dL    Glucose 99 70 - 99 mg/dL    Alkaline Phosphatase 51 40 - 150 U/L    AST 15 0 - 45 U/L    ALT 24 0 - 70 U/L    Protein Total 6.7 (L) 6.8 - 8.8 g/dL    Albumin 3.3 (L) 3.4 - 5.0 g/dL    Bilirubin Total 1.1 0.2 - 1.3 mg/dL    GFR Estimate >90 >60 mL/min/1.73m2   CBC with platelets and differential   Result Value Ref Range    WBC Count 3.8 (L) 4.0 - 11.0 10e3/uL    RBC Count 5.21 4.40 - 5.90 10e6/uL    Hemoglobin 15.0 13.3 - 17.7 g/dL    Hematocrit 44.2 40.0 - 53.0 %    MCV 85 78 - 100 fL    MCH 28.8 26.5 - 33.0 pg    MCHC 33.9 31.5 - 36.5 g/dL    RDW 11.6 10.0 - 15.0 %    Platelet Count 245 150 - 450 10e3/uL    % Neutrophils 55 %    % Lymphocytes 28 %    % Monocytes 12 %    % Eosinophils 4 %    % Basophils 1 %    % Immature Granulocytes 0 %    NRBCs per 100 WBC 0 <1 /100    Absolute Neutrophils 2.1 1.6 - 8.3 10e3/uL    Absolute Lymphocytes 1.1 0.8 - 5.3 10e3/uL    Absolute Monocytes 0.4 0.0 - 1.3 10e3/uL    Absolute Eosinophils 0.2 0.0 - 0.7 10e3/uL    Absolute Basophils 0.0 0.0 - 0.2 10e3/uL    Absolute Immature Granulocytes 0.0 <=0.0 10e3/uL    Absolute NRBCs 0.0 10e3/uL   POC US  ABDOMEN LIMITED    Impression    [facility] Procedure Note      Limited Bedside ED Bowel Ultrasound:    PROCEDURE: PERFORMED BY: Dr. Korey Joseph  INDICATIONS:  Abdominal Pain  PROBE:  Low frequency convex probe and high-frequency linear probe  BODY LOCATION: Abdomen  FINDINGS:   An ultrasound of the bowel was performed using longitudinal and transverse views within all 4 quadrants.  Dilated loops of bowel with abnormal peristalsis  INTERPRETATION: Ultrasound of the bowel consistent with small bowel obstruction  IMAGE DOCUMENTATION: Images were archived to PACs system.          Medications   lactated ringers infusion ( Intravenous New Bag 8/20/21 0028)   0.9% sodium chloride BOLUS (0 mLs Intravenous Stopped 8/19/21 2007)       Assessments & Plan (with Medical Decision Making)   41-year-old male who presents for abdominal pain.  Vital signs are reassuring here.  He declined pain medications.  Bedside ultrasound shows dilated loops of bowel with abnormal peristalsis consistent with small bowel obstruction.  I was able to get the CT images pushed into our system, and this does also show small bowel obstruction.  He does not have a distended stomach and is not vomiting currently so therefore we will hold off on NG tube at this time.  He is given IV fluids.  White blood cell count is 3.8 and hemoglobin is 15.  At this point the patient requires admission to the hospital for further observation and treatment of a small bowel obstruction, unfortunately there are no hospital beds available here or anywhere else in the state currently.  Therefore he will be boarded in the emergency department here until a bed becomes available.  Patient signed out to Dr. Olmstead at change of shift.    I have reviewed the nursing notes.    I have reviewed the findings, diagnosis, plan and need for follow up with the patient.       New Prescriptions    No medications on file       Final diagnoses:   SBO (small bowel obstruction)  (H)       8/19/2021   [facility] EMERGENCY DEPT     Korey Joseph MD  08/20/21 0200

## 2021-08-20 NOTE — ED PROVIDER NOTES
Emergency Department Patient Sign-out       Brief HPI:  This is a 41 year old male signed out to me by Dr. WANDER Olmstead at shift change at 6 AM..  See initial ED Provider note for details of the presentation.  At shift change and handoff patient is awaiting a bed with plan for further management of subacute symptoms after he was found to have abnormal small bowel dilatation with transition in the pelvis without an identifiable cause for the change in caliber with CT findings concerning for small bowel obstruction or ileus related to infection-Per details in the radiology report.  Patient is boarding awaiting bed availability may require transfer.  Consider consulting general surgery locally if a bed becomes available for further care.  Patient has not required NG decompression.         Significant Events after my assuming care: Reviewed details of evaluation and care prior to handoff.  CT interpretation report from August 19, 2021 as noted in the medical record was also reviewed.  Patient was seen and examined at 10:20 AM. We discussed handoff. Patient confirms symptoms reported to the initial treating provider. Pain since Sunday- 8/15/21. Prior diagnosis of psoriatic arthritis on hydroxychloroquine, and Protonix for heartburn. No personal history of inflammatory or irritable bowel syndrome. Home in [facility]. Episode of vomiting. No diarrhea. No prior colonoscopy or endoscopy and no prior surgery.     I spoke with Dr. ISRAEL Baumann-admitting provider at 1 PM  who agreed to accept patient for admission after reviewing rationale for admission.  We discuss history as reported to initial treating provider prior to handoff and on my examination after assessment of the patient.  General surgery consultation follow-up     I spoke with Dr. WANDER Kellogg-general surgeon on-call at 1.30pm. We discussed patient's ED boarding course presentation to the department prior to handoff and plan to admit to medicine.  We agreed  that if the patient was pain-free, passing flatus and stool that he had essentially declared himself during his ED course and there is not unreasonable to discharge him with plan for outpatient follow-up particular because he is remained hemodynamically stable is not distended, lives locally, appears reliable and is able to have urgent follow-up locally within the week.    I spoke with patient and reviewed my discussion with the general surgeon on-call.  Patient expressed comfort going home after his 21-hour ED course we discussed that he has been observed and that he has had resolution of his pain no distention no vomiting has been passing gas.  Understands that he can be admitted to the hospital however he felt that he would be more comfortable at home and he appears reliable and lives close to the hospital and can return if his symptoms worsen.  We discussed he would benefit from further diagnostic evaluation and care    updated the admitting hospitalist team and provider who had initially agreed to accept the patient for admission by patient's plan to trial outpatient care with low threshold to return to the department to be evaluated           Exam:   Patient Vitals for the past 24 hrs:   BP Temp Temp src Pulse Resp SpO2 Weight   08/20/21 1148 -- -- -- -- -- 99 % --   08/20/21 1145 129/80 -- -- 57 -- 99 % --   08/20/21 0620 (!) 134/100 -- -- 68 -- 98 % --   08/20/21 0252 98/62 98.4  F (36.9  C) -- -- 16 99 % --   08/19/21 2345 123/83 -- -- 62 -- 97 % --   08/19/21 2330 119/71 -- -- 54 -- 96 % --   08/19/21 2315 117/77 -- -- 55 -- 96 % --   08/19/21 2300 125/79 -- -- 52 -- 98 % --   08/19/21 2245 115/83 -- -- 60 -- 98 % --   08/19/21 2230 116/79 -- -- 59 -- 99 % --   08/19/21 2215 117/81 -- -- 59 -- 97 % --   08/19/21 2200 (!) 115/91 -- -- 62 -- 99 % --   08/19/21 2145 (!) 126/94 -- -- 56 -- 99 % --   08/19/21 2130 122/77 -- -- 60 -- 98 % --   08/19/21 2115 (!) 125/99 -- -- 61 -- 99 % --   08/19/21 2100 127/89  -- -- 56 -- 99 % --   08/19/21 2045 124/89 -- -- 57 -- 100 % --   08/19/21 2030 (!) 128/90 -- -- 58 -- 99 % --   08/19/21 2015 (!) 120/93 -- -- 53 -- 98 % --   08/19/21 2000 (!) 146/95 -- -- 59 -- 99 % --   08/19/21 1945 (!) 129/91 -- -- 58 -- 99 % --   08/19/21 1907 (!) 132/96 -- -- 56 -- -- --   08/19/21 1701 (!) 150/78 97.3  F (36.3  C) Temporal 66 16 100 % 88.9 kg (196 lb)         ED RESULTS:   Results for orders placed or performed during the hospital encounter of 08/19/21 (from the past 24 hour(s))   [place] Draw     Status: None    Collection Time: 08/19/21  7:05 PM    Narrative    The following orders were created for panel order [place] Draw.  Procedure                               Abnormality         Status                     ---------                               -----------         ------                     Extra Blue Top Tube[[id number]]                              Final result               Extra Red Top Tube[[id number]]                               Final result               Extra Green Top (Lithium...[[id number]]                      Final result               Extra Green Top (Lithium...[[id number]]                      Final result               Extra Purple Top Tube[[id number]]                            Final result                 Please view results for these tests on the individual orders.   Extra Blue Top Tube     Status: None    Collection Time: 08/19/21  7:05 PM   Result Value Ref Range    Hold Specimen [facility]    Extra Red Top Tube     Status: None    Collection Time: 08/19/21  7:05 PM   Result Value Ref Range    Hold Specimen [facility]    Extra Green Top (Lithium Heparin) Tube     Status: None    Collection Time: 08/19/21  7:05 PM   Result Value Ref Range    Hold Specimen [facility]    Extra Green Top (Lithium Heparin) Tube     Status: None    Collection Time: 08/19/21  7:05 PM   Result Value Ref Range    Hold Specimen [facility]    Extra Purple Top Tube     Status: None    Collection Time: 08/19/21  7:05 PM    Result Value Ref Range    Hold Specimen [facility]    CBC with platelets, differential     Status: Abnormal    Collection Time: 08/19/21  7:05 PM    Narrative    The following orders were created for panel order CBC with platelets, differential.  Procedure                               Abnormality         Status                     ---------                               -----------         ------                     CBC with platelets and d...[[id number]]  Abnormal            Final result                 Please view results for these tests on the individual orders.   Comprehensive metabolic panel     Status: Abnormal    Collection Time: 08/19/21  7:05 PM   Result Value Ref Range    Sodium 139 133 - 144 mmol/L    Potassium 3.8 3.4 - 5.3 mmol/L    Chloride 100 94 - 109 mmol/L    Carbon Dioxide (CO2) 33 (H) 20 - 32 mmol/L    Anion Gap 6 3 - 14 mmol/L    Urea Nitrogen 14 7 - 30 mg/dL    Creatinine 0.89 0.66 - 1.25 mg/dL    Calcium 8.6 8.5 - 10.1 mg/dL    Glucose 99 70 - 99 mg/dL    Alkaline Phosphatase 51 40 - 150 U/L    AST 15 0 - 45 U/L    ALT 24 0 - 70 U/L    Protein Total 6.7 (L) 6.8 - 8.8 g/dL    Albumin 3.3 (L) 3.4 - 5.0 g/dL    Bilirubin Total 1.1 0.2 - 1.3 mg/dL    GFR Estimate >90 >60 mL/min/1.73m2   CBC with platelets and differential     Status: Abnormal    Collection Time: 08/19/21  7:05 PM   Result Value Ref Range    WBC Count 3.8 (L) 4.0 - 11.0 10e3/uL    RBC Count 5.21 4.40 - 5.90 10e6/uL    Hemoglobin 15.0 13.3 - 17.7 g/dL    Hematocrit 44.2 40.0 - 53.0 %    MCV 85 78 - 100 fL    MCH 28.8 26.5 - 33.0 pg    MCHC 33.9 31.5 - 36.5 g/dL    RDW 11.6 10.0 - 15.0 %    Platelet Count 245 150 - 450 10e3/uL    % Neutrophils 55 %    % Lymphocytes 28 %    % Monocytes 12 %    % Eosinophils 4 %    % Basophils 1 %    % Immature Granulocytes 0 %    NRBCs per 100 WBC 0 <1 /100    Absolute Neutrophils 2.1 1.6 - 8.3 10e3/uL    Absolute Lymphocytes 1.1 0.8 - 5.3 10e3/uL    Absolute Monocytes 0.4 0.0 - 1.3 10e3/uL    Absolute  Eosinophils 0.2 0.0 - 0.7 10e3/uL    Absolute Basophils 0.0 0.0 - 0.2 10e3/uL    Absolute Immature Granulocytes 0.0 <=0.0 10e3/uL    Absolute NRBCs 0.0 10e3/uL   POC US ABDOMEN LIMITED     Status: None    Collection Time: 08/19/21  8:10 PM    [facility] Procedure Note      Limited Bedside ED Bowel Ultrasound:    PROCEDURE: PERFORMED BY: Dr. Korey Joseph  INDICATIONS:  Abdominal Pain  PROBE:  Low frequency convex probe and high-frequency linear probe  BODY LOCATION: Abdomen  FINDINGS:   An ultrasound of the bowel was performed using longitudinal and transverse views within all 4 quadrants.  Dilated loops of bowel with abnormal peristalsis  INTERPRETATION: Ultrasound of the bowel consistent with small bowel obstruction  IMAGE DOCUMENTATION: Images were archived to PACs system.      Asymptomatic COVID-19 Virus (Coronavirus) by PCR Nasopharyngeal     Status: Normal    Collection Time: 08/20/21  2:52 AM    Specimen: Nasopharyngeal; Swab   Result Value Ref Range    SARS CoV2 PCR Negative Negative    Narrative    Testing was performed using the nerissa  SARS-CoV-2 & Influenza A/B Assay on the nerissa  [place]  System.  This test should be ordered for the detection of SARS-COV-2 in individuals who meet SARS-CoV-2 clinical and/or epidemiological criteria. Test performance is unknown in asymptomatic patients.  This test is for in vitro diagnostic use under the FDA EUA for laboratories certified under CLIA to perform moderate and/or high complexity testing. This test has not been FDA cleared or approved.  A negative test does not rule out the presence of PCR inhibitors in the specimen or target RNA in concentration below the limit of detection for the assay. The possibility of a false negative should be considered if the patient's recent exposure or clinical presentation suggests COVID-19.  [facility] are certified under the Clinical Laboratory Improvement Amendments of 1988 (CLIA-88)  as qualified to perform moderate and/or high complexity laboratory testing.       ED MEDICATIONS:   Medications   lactated ringers infusion (0 mLs Intravenous Stopped 8/20/21 1150)   lactated ringers infusion (0 mLs Intravenous Stopped 8/20/21 1418)   dextrose 5% and 0.9% NaCl infusion (has no administration in time range)   0.9% sodium chloride BOLUS (0 mLs Intravenous Stopped 8/19/21 2007)         Impression:    ICD-10-CM    1. SBO (small bowel obstruction) (H)  K56.609 Adult General Surg Referral   2. Abdominal pain, generalized  R10.84 Adult General Surg Referral   3. History of psoriatic arthritis  Z87.2    4. Abnormal CT scan  R93.89     Abdomen pelvis completed at outside facility ([facility])       Plan:    Discharge to home with urgent follow-up in general surgery clinic within the next 96 hours.  Low threshold to return for care including return of abdominal pain nausea and/or vomiting, abdominal distention, and no passage of flatus within 24 hours or any new concerns.      MD Gayle Cantrell Ebenezer Tope, MD  08/20/21 1353

## 2021-08-20 NOTE — ED NOTES
Pt resting in bed. Up to the bathroom. No BM today. Cramps at time, but denies pain. Airway patent, resting in bed. Updated to delay and pain.

## 2021-08-20 NOTE — ED PROVIDER NOTES
Emergency Department Patient Sign-out       Brief HPI:  This is a 41 year old male signed out to me by Dr. Joseph .  See initial ED Provider note for details of the presentation.            Significant Events prior to my assuming care:   41 year old male presenting with abdominal pain, waxing and waning over the past 6 days, with crampy diffuse abdominal pain, worsened with eating or drinking.  Patient was seen in outside urgent care earlier in the day yesterday, having CT scan showing concerns for possible small bowel obstruction.  Patient noted to have general abdominal tenderness.  Laboratory work-up unremarkable.  CT with concerns of bowel obstruction, and no distended stomach, and therefore holding on NG tube.  No beds available in the system, or [facility].  And therefore patient has remained in the emergency department overnight.    Surgery consult has not yet been performed.  If remaining in the emergency department would consider surgery consult in the ED while awaiting available bed.      Exam:   Patient Vitals for the past 24 hrs:   BP Temp Temp src Pulse Resp SpO2 Weight   08/20/21 0252 98/62 98.4  F (36.9  C) -- -- 16 99 % --   08/19/21 2345 123/83 -- -- 62 -- 97 % --   08/19/21 2330 119/71 -- -- 54 -- 96 % --   08/19/21 2315 117/77 -- -- 55 -- 96 % --   08/19/21 2300 125/79 -- -- 52 -- 98 % --   08/19/21 2245 115/83 -- -- 60 -- 98 % --   08/19/21 2230 116/79 -- -- 59 -- 99 % --   08/19/21 2215 117/81 -- -- 59 -- 97 % --   08/19/21 2200 (!) 115/91 -- -- 62 -- 99 % --   08/19/21 2145 (!) 126/94 -- -- 56 -- 99 % --   08/19/21 2130 122/77 -- -- 60 -- 98 % --   08/19/21 2115 (!) 125/99 -- -- 61 -- 99 % --   08/19/21 2100 127/89 -- -- 56 -- 99 % --   08/19/21 2045 124/89 -- -- 57 -- 100 % --   08/19/21 2030 (!) 128/90 -- -- 58 -- 99 % --   08/19/21 2015 (!) 120/93 -- -- 53 -- 98 % --   08/19/21 2000 (!) 146/95 -- -- 59 -- 99 % --   08/19/21 1945 (!) 129/91 -- -- 58 -- 99 % --   08/19/21 1907 (!)  132/96 -- -- 56 -- -- --   08/19/21 1701 (!) 150/78 97.3  F (36.3  C) Temporal 66 16 100 % 88.9 kg (196 lb)           ED RESULTS:   Results for orders placed or performed during the hospital encounter of 08/19/21 (from the past 24 hour(s))   [place] Draw     Status: None    Collection Time: 08/19/21  7:05 PM    Narrative    The following orders were created for panel order [place] Draw.  Procedure                               Abnormality         Status                     ---------                               -----------         ------                     Extra Blue Top Tube[[id number]]                              Final result               Extra Red Top Tube[[id number]]                               Final result               Extra Green Top (Lithium...[[id number]]                      Final result               Extra Green Top (Lithium...[[id number]]                      Final result               Extra Purple Top Tube[[id number]]                            Final result                 Please view results for these tests on the individual orders.   Extra Blue Top Tube     Status: None    Collection Time: 08/19/21  7:05 PM   Result Value Ref Range    Hold Specimen [facility]    Extra Red Top Tube     Status: None    Collection Time: 08/19/21  7:05 PM   Result Value Ref Range    Hold Specimen [facility]    Extra Green Top (Lithium Heparin) Tube     Status: None    Collection Time: 08/19/21  7:05 PM   Result Value Ref Range    Hold Specimen [facility]    Extra Green Top (Lithium Heparin) Tube     Status: None    Collection Time: 08/19/21  7:05 PM   Result Value Ref Range    Hold Specimen [facility]    Extra Purple Top Tube     Status: None    Collection Time: 08/19/21  7:05 PM   Result Value Ref Range    Hold Specimen [facility]    CBC with platelets, differential     Status: Abnormal    Collection Time: 08/19/21  7:05 PM    Narrative    The following orders were created for panel order CBC with platelets, differential.  Procedure                                Abnormality         Status                     ---------                               -----------         ------                     CBC with platelets and d...[[id number]]  Abnormal            Final result                 Please view results for these tests on the individual orders.   Comprehensive metabolic panel     Status: Abnormal    Collection Time: 08/19/21  7:05 PM   Result Value Ref Range    Sodium 139 133 - 144 mmol/L    Potassium 3.8 3.4 - 5.3 mmol/L    Chloride 100 94 - 109 mmol/L    Carbon Dioxide (CO2) 33 (H) 20 - 32 mmol/L    Anion Gap 6 3 - 14 mmol/L    Urea Nitrogen 14 7 - 30 mg/dL    Creatinine 0.89 0.66 - 1.25 mg/dL    Calcium 8.6 8.5 - 10.1 mg/dL    Glucose 99 70 - 99 mg/dL    Alkaline Phosphatase 51 40 - 150 U/L    AST 15 0 - 45 U/L    ALT 24 0 - 70 U/L    Protein Total 6.7 (L) 6.8 - 8.8 g/dL    Albumin 3.3 (L) 3.4 - 5.0 g/dL    Bilirubin Total 1.1 0.2 - 1.3 mg/dL    GFR Estimate >90 >60 mL/min/1.73m2   CBC with platelets and differential     Status: Abnormal    Collection Time: 08/19/21  7:05 PM   Result Value Ref Range    WBC Count 3.8 (L) 4.0 - 11.0 10e3/uL    RBC Count 5.21 4.40 - 5.90 10e6/uL    Hemoglobin 15.0 13.3 - 17.7 g/dL    Hematocrit 44.2 40.0 - 53.0 %    MCV 85 78 - 100 fL    MCH 28.8 26.5 - 33.0 pg    MCHC 33.9 31.5 - 36.5 g/dL    RDW 11.6 10.0 - 15.0 %    Platelet Count 245 150 - 450 10e3/uL    % Neutrophils 55 %    % Lymphocytes 28 %    % Monocytes 12 %    % Eosinophils 4 %    % Basophils 1 %    % Immature Granulocytes 0 %    NRBCs per 100 WBC 0 <1 /100    Absolute Neutrophils 2.1 1.6 - 8.3 10e3/uL    Absolute Lymphocytes 1.1 0.8 - 5.3 10e3/uL    Absolute Monocytes 0.4 0.0 - 1.3 10e3/uL    Absolute Eosinophils 0.2 0.0 - 0.7 10e3/uL    Absolute Basophils 0.0 0.0 - 0.2 10e3/uL    Absolute Immature Granulocytes 0.0 <=0.0 10e3/uL    Absolute NRBCs 0.0 10e3/uL   POC US ABDOMEN LIMITED     Status: None    Collection Time: 08/19/21  8:10 PM    [facility]  Procedure Note      Limited Bedside ED Bowel Ultrasound:    PROCEDURE: PERFORMED BY: Dr. Korey Joseph  INDICATIONS:  Abdominal Pain  PROBE:  Low frequency convex probe and high-frequency linear probe  BODY LOCATION: Abdomen  FINDINGS:   An ultrasound of the bowel was performed using longitudinal and transverse views within all 4 quadrants.  Dilated loops of bowel with abnormal peristalsis  INTERPRETATION: Ultrasound of the bowel consistent with small bowel obstruction  IMAGE DOCUMENTATION: Images were archived to PACs system.      Asymptomatic COVID-19 Virus (Coronavirus) by PCR Nasopharyngeal     Status: Normal    Collection Time: 08/20/21  2:52 AM    Specimen: Nasopharyngeal; Swab   Result Value Ref Range    SARS CoV2 PCR Negative Negative    Narrative    Testing was performed using the nerissa  SARS-CoV-2 & Influenza A/B Assay on the nerissa  [employer]  System.  This test should be ordered for the detection of SARS-COV-2 in individuals who meet SARS-CoV-2 clinical and/or epidemiological criteria. Test performance is unknown in asymptomatic patients.  This test is for in vitro diagnostic use under the FDA EUA for laboratories certified under CLIA to perform moderate and/or high complexity testing. This test has not been FDA cleared or approved.  A negative test does not rule out the presence of PCR inhibitors in the specimen or target RNA in concentration below the limit of detection for the assay. The possibility of a false negative should be considered if the patient's recent exposure or clinical presentation suggests COVID-19.  [facility] are certified under the Clinical Laboratory Improvement Amendments of 1988 (CLIA-88) as qualified to perform moderate and/or high complexity laboratory testing.       ED MEDICATIONS:   Medications   lactated ringers infusion ( Intravenous New Bag 8/20/21 0028)   0.9% sodium chloride BOLUS (0 mLs Intravenous Stopped 8/19/21 2007)         Impression:     ICD-10-CM    1. SBO (small bowel obstruction) (H)  K56.609        Plan:    Pending studies include None.   Awaiting available bed.  Surgical consult has not yet been performed (ordered in Epic, but haven't discussed with general surgeon yet as uncertain if bed available at [facility] vs. Need for transfer).  Will await until available bed for further discussions..        MD Ishan Julian, Jose Sauceda MD  08/20/21 0435

## 2021-08-20 NOTE — DISCHARGE INSTRUCTIONS
1) After a prolonged of observation you appear to be stable to go home and would rather go home than be admitted to the hospital.  We have discussed that your CT imaging completed at outside facility prior to arrival in the department shows concern for partial obstruction/small bowel obstruction without a clear cause.  Based on your age and health history that we discussed we have reviewed that you would need further diagnostic testing.    2) A referral was placed to the general surgery clinic after discussion with the general surgeon on-call here at the hospital to see you for follow-up care with the next step for additional diagnostic procedures.    3) Although you are electing to go home rather than be admitted to the hospital which we think is reasonable because you live close to home if you develop fever, vomiting, cannot pass gas in 24 hours or have increasing abdominal distention or any new concerns you should return to the reevaluated.    4) Until follow-up with general surgery I recommend he have a soft liquid diet including foods such as applesauce.  Try to avoid a regular diet as this could potentially worsen your symptoms until can confirm that your partial obstruction/inflammation in the abdomen has resolved.

## 2021-08-20 NOTE — ED NOTES
Pt d/c instructions reviewed and received. Reinforced that if symptoms worsen to return to ED. Verbalized understanding. Planning low residue diet, f/u this week. There are no unanswered questions at the time of discharge. Pt escorted to lobby for discharge.

## 2021-08-23 ENCOUNTER — TELEPHONE (OUTPATIENT)
Dept: SURGERY | Facility: CLINIC | Age: 41
End: 2021-08-23

## 2021-08-23 NOTE — TELEPHONE ENCOUNTER
Spoke to patient and he stated the nurse had mentioned that he should get a colonoscopy prior to his appt with surgery. Discussed with pt that this was not ordered at this time and that he should discuss with provider on Wednesday. Pt agreed with plan and stated he is sticking to the soft diet and has been doing very well and having good bowel movements. Advised to call if things changed.  Emma HEART RN BSN PHN  Specialty Clinics

## 2021-08-23 NOTE — TELEPHONE ENCOUNTER
Reason for call:  Patient reporting a symptom    Symptom or request: Was in the ER last Thurs & Fri for Abdominal pain.  Has a f.up appt on Wed 8-25-21.  Was told he should have a colonoscopy prior to appt - wants to make sure Dr. Diamond wants this as well.    Duration (how long have symptoms been present):     Have you been treated for this before? No    Additional comments:     Phone Number patient can be reached at:  Home number on file [phone] (home)    Best Time:      Can we leave a detailed message on this number:  YES    Call taken on 8/23/2021 at 8:39 AM by Flory Barker

## 2021-08-25 ENCOUNTER — OFFICE VISIT (OUTPATIENT)
Dept: SURGERY | Facility: CLINIC | Age: 41
End: 2021-08-25
Payer: COMMERCIAL

## 2021-08-25 VITALS
DIASTOLIC BLOOD PRESSURE: 78 MMHG | HEIGHT: 76 IN | BODY MASS INDEX: 22.53 KG/M2 | WEIGHT: 185 LBS | TEMPERATURE: 97.7 F | SYSTOLIC BLOOD PRESSURE: 130 MMHG | HEART RATE: 56 BPM

## 2021-08-25 DIAGNOSIS — K56.609 SBO (SMALL BOWEL OBSTRUCTION) (H): ICD-10-CM

## 2021-08-25 DIAGNOSIS — R10.84 ABDOMINAL PAIN, GENERALIZED: ICD-10-CM

## 2021-08-25 PROCEDURE — 99203 OFFICE O/P NEW LOW 30 MIN: CPT | Performed by: SURGERY

## 2021-08-25 RX ORDER — MULTIVITAMIN,THERAPEUTIC
1 TABLET ORAL
COMMUNITY

## 2021-08-25 ASSESSMENT — MIFFLIN-ST. JEOR: SCORE: 1840.65

## 2021-08-25 ASSESSMENT — PAIN SCALES - GENERAL: PAINLEVEL: NO PAIN (0)

## 2021-08-25 NOTE — LETTER
8/25/2021         RE: Price Gray  [address] [facility] [address]        Dear Colleague,    Thank you for referring your patient, Price Gray, to the [facility]. Please see a copy of my visit note below.    41-year-old male was recently seen in the emergency room and diagnosed with a small bowel obstruction.  Transition point was in the right lower quadrant.  Patient reported increasing pain over several days prior to presentation.  He reported bloating with nausea and vomiting.  The obstruction resolved on its own and he is now tolerating diet.  He is passing gas.  He has no pain.    Patient Active Problem List   Diagnosis     Esophageal Reflux     Polyarthritis     Pneumonia     Cough     Dizziness     Leukopenia     Abdominal pain, generalized     SBO (small bowel obstruction) (H)       No past medical history on file.    Past Surgical History:   Procedure Laterality Date     HC KNEE SCOPE, DIAGNOSTIC      Description: Arthroscopy Knee Left;  Recorded: 08/09/2010;  Comments: 2000, meniscal repair     HC KNEE SCOPE, DIAGNOSTIC      Description: Arthroscopy Knee Right;  Recorded: 08/09/2010;  Comments: 1997, meniscal repair     ORTHOPEDIC SURGERY      knees     REMOVAL OF SPERM DUCT(S)      Description: Surgery Of Male Genitalia Vasectomy;  Recorded: 09/24/2010;  Comments: 9/2010       Family History   Problem Relation Age of Onset     Hypertension Father      Coronary Artery Disease Maternal Grandmother        Social History     Tobacco Use     Smoking status: Former Smoker     Types: Dip, chew, snus or snuff     Smokeless tobacco: Current User     Types: Chew, Chew   Substance Use Topics     Alcohol use: Yes     Comment: Alcoholic Drinks/day: Rare        History   Drug Use Not on file       Current Outpatient Medications   Medication Sig Dispense Refill     CLOBETASOL & CLOBETASOL EMUL EX        fluticasone (FLONASE) 50 MCG/ACT spray Spray 1-2 sprays into both  nostrils daily 16 g 0     hydroxychloroquine (PLAQUENIL) 200 MG tablet TAKE 1 TABLET BY MOUTH 2 TIMES A DAY       Hydroxychloroquine Sulfate (PLAQUENIL PO)        Multiple Vitamins-Minerals (ONCOVITE) TABS Take 1 tablet by mouth       pantoprazole (PROTONIX) 40 MG EC tablet Take 40 mg by mouth daily         No Known Allergies     ROS  CV - No CP or SOB  Resp - No SOB or dyspnea  GI - No nausea or vomiting   - No difficulty with urination    Exam:  AXO3 NAD  Neuro - Strength 5/5 all major groups, sensation intact, PERRL  Lungs - CTA  CV - RRR  Abd - Soft, non-distended, non-tender, +BS  Extr - No edema    A/P: 41-year-old male with small bowel obstruction on CT scan.  Consistent with history.  Always concerning considering no previous operation however has he is getting better I cannot recommend any surgical intervention at this time.  I explained that if he continues to have intermittent bowel obstructions, it may be worth it to look at the anatomy with a scope.  At this time I think he is safe to just resume his normal life and follow-up again as necessary.    Sohail Diamond MD       Again, thank you for allowing me to participate in the care of your patient.        Sincerely,        Sohail Diamond MD

## 2021-08-25 NOTE — PROGRESS NOTES
41-year-old male was recently seen in the emergency room and diagnosed with a small bowel obstruction.  Transition point was in the right lower quadrant.  Patient reported increasing pain over several days prior to presentation.  He reported bloating with nausea and vomiting.  The obstruction resolved on its own and he is now tolerating diet.  He is passing gas.  He has no pain.    Patient Active Problem List   Diagnosis     Esophageal Reflux     Polyarthritis     Pneumonia     Cough     Dizziness     Leukopenia     Abdominal pain, generalized     SBO (small bowel obstruction) (H)       No past medical history on file.    Past Surgical History:   Procedure Laterality Date     HC KNEE SCOPE, DIAGNOSTIC      Description: Arthroscopy Knee Left;  Recorded: 08/09/2010;  Comments: 2000, meniscal repair     HC KNEE SCOPE, DIAGNOSTIC      Description: Arthroscopy Knee Right;  Recorded: 08/09/2010;  Comments: 1997, meniscal repair     ORTHOPEDIC SURGERY      knees     REMOVAL OF SPERM DUCT(S)      Description: Surgery Of Male Genitalia Vasectomy;  Recorded: 09/24/2010;  Comments: 9/2010       Family History   Problem Relation Age of Onset     Hypertension Father      Coronary Artery Disease Maternal Grandmother        Social History     Tobacco Use     Smoking status: Former Smoker     Types: Dip, chew, snus or snuff     Smokeless tobacco: Current User     Types: Chew, Chew   Substance Use Topics     Alcohol use: Yes     Comment: Alcoholic Drinks/day: Rare        History   Drug Use Not on file       Current Outpatient Medications   Medication Sig Dispense Refill     CLOBETASOL & CLOBETASOL EMUL EX        fluticasone (FLONASE) 50 MCG/ACT spray Spray 1-2 sprays into both nostrils daily 16 g 0     hydroxychloroquine (PLAQUENIL) 200 MG tablet TAKE 1 TABLET BY MOUTH 2 TIMES A DAY       Hydroxychloroquine Sulfate (PLAQUENIL PO)        Multiple Vitamins-Minerals (ONCOVITE) TABS Take 1 tablet by mouth       pantoprazole (PROTONIX)  40 MG EC tablet Take 40 mg by mouth daily         No Known Allergies     ROS  CV - No CP or SOB  Resp - No SOB or dyspnea  GI - No nausea or vomiting   - No difficulty with urination    Exam:  AXO3 NAD  Neuro - Strength 5/5 all major groups, sensation intact, PERRL  Lungs - CTA  CV - RRR  Abd - Soft, non-distended, non-tender, +BS  Extr - No edema    A/P: 41-year-old male with small bowel obstruction on CT scan.  Consistent with history.  Always concerning considering no previous operation however has he is getting better I cannot recommend any surgical intervention at this time.  I explained that if he continues to have intermittent bowel obstructions, it may be worth it to look at the anatomy with a scope.  At this time I think he is safe to just resume his normal life and follow-up again as necessary.    Sohail Diamond MD

## 2021-08-25 NOTE — NURSING NOTE
"Initial /78 (BP Location: Right arm, Patient Position: Sitting, Cuff Size: Adult Regular)   Pulse 56   Temp 97.7  F (36.5  C) (Tympanic)   Ht 1.93 m (6' 4\")   Wt 83.9 kg (185 lb)   BMI 22.52 kg/m   Estimated body mass index is 22.52 kg/m  as calculated from the following:    Height as of this encounter: 1.93 m (6' 4\").    Weight as of this encounter: 83.9 kg (185 lb). .    Adela Taylor LPN on 8/25/2021 at 1:25 PM      "

## 2022-01-14 ENCOUNTER — VIRTUAL VISIT (OUTPATIENT)
Dept: FAMILY MEDICINE | Facility: CLINIC | Age: 42
End: 2022-01-14
Payer: COMMERCIAL

## 2022-01-14 DIAGNOSIS — R29.898 LEG WEAKNESS, BILATERAL: ICD-10-CM

## 2022-01-14 DIAGNOSIS — L30.9 ECZEMA, UNSPECIFIED TYPE: Primary | ICD-10-CM

## 2022-01-14 PROCEDURE — 99451 NTRPROF PH1/NTRNET/EHR 5/>: CPT | Performed by: FAMILY MEDICINE

## 2022-01-14 RX ORDER — CLOBETASOL PROPIONATE 0.5 MG/G
OINTMENT TOPICAL 2 TIMES DAILY PRN
Qty: 45 G | Refills: 1 | Status: SHIPPED | OUTPATIENT
Start: 2022-01-14

## 2022-01-14 NOTE — PROGRESS NOTES
Price is a 41 year old who is being evaluated via a billable telephone visit.      What phone number would you like to be contacted at? [phone]  How would you like to obtain your AVS? Mail a copy    Assessment & Plan     Eczema, unspecified type  Refill sent to the pharmacy  - clobetasol (TEMOVATE) 0.05 % external ointment; Apply topically 2 times daily as needed (Eczema)    Leg weakness, bilateral  Patient continues over the last year to have proximal muscle weakness with activity in the bilateral lower extremities he has not been noticing this is much in the upper extremities nor in the lower parts of the legs  He is maintaining normal physical activity but notes to be progressively weak and fatiguing quickly-discussed different options today and we will proceed with an EMG to rule out any nerve etiology  With the proximal muscle nature components discussed with him possible referral to rheumatology-he does have a rheumatologist but he follows with and he is on Plaquenil  He like to start with EMG and we will follow based on results  - EMG; Future        No follow-ups on file.    Tito Romero MD  [facility]    Subjective   Price is a 41 year old who presents for the following health issues     HPI     41-year-old male with continued problems with proximal muscle weakness and fatigue which is atypical for him-we had a conversation with him last year in regards to this plan was due to possible low risk side effect of Plaquenil he was discussed with his rheumatologist going off the medication-he has done this periodically and has not noticed any change in the symptoms of his legs.  It affects his proximal muscles of his bilateral lower legs but not distal legs that does not affect his upper extremities.  He is an avid biker and has been noting extreme fatigue very quickly and was bike workouts-discussed with him different possible options such as referral to  rheumatology-he does already see a rheumatologist and states that he can discuss with them at their follow-up possibility of muscle etiology for this.  We discussed nerve etiology and he like to pursue an EMG to see if there is any nerve abnormalities that will need further follow-up.  EMG referral placed and we will follow-up based on results.    Review of Systems   Constitutional, HEENT, cardiovascular, pulmonary, gi and gu systems are negative, except as otherwise noted.      Objective           Vitals:  No vitals were obtained today due to virtual visit.    Physical Exam   healthy, alert and no distress  PSYCH: Alert and oriented times 3; coherent speech, normal   rate and volume, able to articulate logical thoughts, able   to abstract reason, no tangential thoughts, no hallucinations   or delusions  His affect is normal  RESP: No cough, no audible wheezing, able to talk in full sentences  Remainder of exam unable to be completed due to telephone visits            Phone call duration: 12 minutes

## 2022-02-28 DIAGNOSIS — K21.00 GASTROESOPHAGEAL REFLUX DISEASE WITH ESOPHAGITIS WITHOUT HEMORRHAGE: Primary | ICD-10-CM

## 2022-03-01 RX ORDER — PANTOPRAZOLE SODIUM 40 MG/1
TABLET, DELAYED RELEASE ORAL
Qty: 90 TABLET | Refills: 2 | Status: SHIPPED | OUTPATIENT
Start: 2022-03-01 | End: 2022-11-27

## 2022-03-03 ENCOUNTER — OFFICE VISIT (OUTPATIENT)
Dept: NEUROLOGY | Facility: CLINIC | Age: 42
End: 2022-03-03
Attending: FAMILY MEDICINE
Payer: COMMERCIAL

## 2022-03-03 DIAGNOSIS — R29.898 LEG WEAKNESS, BILATERAL: ICD-10-CM

## 2022-03-03 PROCEDURE — 95886 MUSC TEST DONE W/N TEST COMP: CPT | Mod: RT | Performed by: PSYCHIATRY & NEUROLOGY

## 2022-03-03 PROCEDURE — 95910 NRV CNDJ TEST 7-8 STUDIES: CPT | Performed by: PSYCHIATRY & NEUROLOGY

## 2022-03-03 NOTE — LETTER
3/3/2022         RE: Price Gray  [address] [facility] [address]        Dear Colleague,    Thank you for referring your patient, Price Gray, to the [facility] [place]. Please see a copy of my visit note below.    See procedure note.       Again, thank you for allowing me to participate in the care of your patient.        Sincerely,        Law Hooks MD

## 2022-03-03 NOTE — PROCEDURES
[facility]    Formerly [facility]  [address] [place] [address], [address]  [place] [address]  Tel: [phone]  Fax: [phone]          Full Name: Price Gray Gender: Male  Patient ID: [id number] YOB: 1980      Visit Date: 3/3/2022 07:31  Age: 41 Years 9 Months Old  Interpreted By: Lwa Hooks MD   Ref Dr.: Tito Romero MD  Tech: ST   Height: 6 feet 3 inch  Reason for referral: Evaluate bilateral lowers. c/o weakness in both legs > 1 year. Right = Left.       Motor NCS      Nerve / Sites Lat Amp Dist Ramu    ms mV cm m/s   R Peroneal - EDB      Ankle 5.73 5.4 8       Fib head 13.59 4.7 31.5 40      Pop fossa 16.04 4.5 10 41   L Peroneal - EDB      Ankle 5.16 4.7 8       Fib head 14.69 4.7 36 38      Pop fossa 17.29 4.5 10 38   R Tibial - AH      Ankle 6.56 10.2 8       Pop fossa 17.92 9.8 46 41   L Tibial - AH      Ankle 6.35 9.5 8       Pop fossa 19.38 8.5 45 35       F  Wave      Nerve Fmin    ms   R Tibial - AH 69.74   L Tibial - AH 69.74       Sensory NCS      Nerve / Sites Onset Lat Pk Lat Amp.2-3 Dist Ramu    ms ms  V cm m/s   R Sural - Ankle (Calf)      Calf 3.44 4.27 5.5 14 41   L Sural - Ankle (Calf)      Calf 2.97 3.70 10.8 14 47   R Superficial peroneal - Ankle      Lat leg 3.02 3.70 8.1 12 40   L Superficial peroneal - Ankle      Lat leg 2.86 3.85 7.0 12 42       EMG Summary Table     Spontaneous MUAP Rcmt Note   Muscle Fib PSW Fasc IA # Amp Dur PPP Rate Type   R. Gluteus medius None None None N N N N N N N   R. Gluteus crow None None None N N N N N N N   R. Upper paraspinal None None None N N N N N N N   R. Middle paraspinal None None None N N N N N N N   R. Lower paraspinal None None None N N N N N N N   R. Adductor rainer None None None N N N N N N N   R. Quadriceps None None None N N N N N N N   R. Tibialis anterior None None None N N N N N N N   R. Gastrocnemius (Medial head) None None None N N N N N N N   R.  Tibialis posterior None None None N N N N N N N   L. Adductor rainer None None None N N N N N N N   L. Quadriceps None None None N N N N N N N   L. Tibialis anterior None None None N N N N N N N   L. Gastrocnemius (Medial head) None None None N N N N N N N   L. Tibialis posterior None None None N N N N N N N     SUMMARY  Nerve conduction and EMG study of bilateral lower extremities shows:    Normal right peroneal distal motor latency, amplitude and conduction velocity.  Normal left peroneal distal motor latency, amplitude and borderline low conduction velocity.  Normal right tibial distal motor latency, amplitude and conduction velocity.  Normal left tibial distal motor latency, amplitude and borderline low conduction velocity.  Normal bilateral sural and superficial peroneal sensory SNAP.  Monopolar needle exam is normal.    CLINICAL INTERPRETATION:  This is a normal nerve conduction and EMG study.  The borderline low conduction velocities of the left peroneal and left tibial nerve are suspected to be artifactual though could represent very early neuropathy.  Further clinical correlation is needed.     Law Hooks MD  Neurologist  [facility]  Tel:- [phone]

## 2022-11-26 DIAGNOSIS — K21.00 GASTROESOPHAGEAL REFLUX DISEASE WITH ESOPHAGITIS WITHOUT HEMORRHAGE: ICD-10-CM

## 2022-11-27 RX ORDER — PANTOPRAZOLE SODIUM 40 MG/1
TABLET, DELAYED RELEASE ORAL
Qty: 90 TABLET | Refills: 0 | Status: SHIPPED | OUTPATIENT
Start: 2022-11-27 | End: 2023-02-28

## 2022-11-28 NOTE — TELEPHONE ENCOUNTER
"Last Written Prescription Date:  3/1/22  Last Fill Quantity: 90,  # refills: 2   Last office visit provider:  1/14/22     Requested Prescriptions   Pending Prescriptions Disp Refills     pantoprazole (PROTONIX) 40 MG EC tablet [Pharmacy Med Name: PANTOPRAZOLE 40MG] 90 tablet 1     Sig: TAKE 1 TABLET BY MOUTH ONCE DAILY       PPI Protocol Passed - 11/26/2022  8:59 AM        Passed - Not on Clopidogrel (unless Pantoprazole ordered)        Passed - No diagnosis of osteoporosis on record        Passed - Recent (12 mo) or future (30 days) visit within the authorizing provider's specialty     Patient has had an office visit with the authorizing provider or a provider within the authorizing providers department within the previous 12 mos or has a future within next 30 days. See \"Patient Info\" tab in inbasket, or \"Choose Columns\" in Meds & Orders section of the refill encounter.              Passed - Medication is active on med list        Passed - Patient is age 18 or older             Shanelle Laurent RN 11/27/22 9:02 PM  "

## 2023-02-27 DIAGNOSIS — K21.00 GASTROESOPHAGEAL REFLUX DISEASE WITH ESOPHAGITIS WITHOUT HEMORRHAGE: ICD-10-CM

## 2023-02-28 RX ORDER — PANTOPRAZOLE SODIUM 40 MG/1
TABLET, DELAYED RELEASE ORAL
Qty: 90 TABLET | Refills: 0 | Status: SHIPPED | OUTPATIENT
Start: 2023-02-28 | End: 2023-05-31

## 2023-02-28 NOTE — TELEPHONE ENCOUNTER
"Routing refill request to provider for review/approval because:  Patient needs to be seen because it has been more than 1 year since last office visit.    Last Written Prescription Date:  11/27/2022  Last Fill Quantity: 90,  # refills: 0   Last office visit provider:  1/14/2022     Requested Prescriptions   Pending Prescriptions Disp Refills     pantoprazole (PROTONIX) 40 MG EC tablet [Pharmacy Med Name: PANTOPRAZOLE 40MG] 90 tablet 0     Sig: TAKE 1 TABLET BY MOUTH ONCE DAILY       PPI Protocol Failed - 2/28/2023 10:17 AM        Failed - Recent (12 mo) or future (30 days) visit within the authorizing provider's specialty     Patient has had an office visit with the authorizing provider or a provider within the authorizing providers department within the previous 12 mos or has a future within next 30 days. See \"Patient Info\" tab in inbasket, or \"Choose Columns\" in Meds & Orders section of the refill encounter.              Passed - Not on Clopidogrel (unless Pantoprazole ordered)        Passed - No diagnosis of osteoporosis on record        Passed - Medication is active on med list        Passed - Patient is age 18 or older             Marlene Carrera RN 02/28/23 10:17 AM  "

## 2023-05-30 DIAGNOSIS — K21.00 GASTROESOPHAGEAL REFLUX DISEASE WITH ESOPHAGITIS WITHOUT HEMORRHAGE: ICD-10-CM

## 2023-05-31 RX ORDER — PANTOPRAZOLE SODIUM 40 MG/1
TABLET, DELAYED RELEASE ORAL
Qty: 90 TABLET | Refills: 0 | Status: SHIPPED | OUTPATIENT
Start: 2023-05-31 | End: 2023-08-21

## 2023-05-31 NOTE — TELEPHONE ENCOUNTER
"Routing refill request to provider for review/approval because:  Patient needs to be seen because it has been more than 1 year since last office visit.    Last Written Prescription Date:  2/28/23  Last Fill Quantity: 90,  # refills: 0   Last office visit provider:  1/14/22     Requested Prescriptions   Pending Prescriptions Disp Refills     pantoprazole (PROTONIX) 40 MG EC tablet [Pharmacy Med Name: PANTOPRAZOLE 40MG] 90 tablet 0     Sig: TAKE 1 TABLET BY MOUTH ONCE DAILY       PPI Protocol Failed - 5/30/2023  8:53 AM        Failed - Recent (12 mo) or future (30 days) visit within the authorizing provider's specialty     Patient has had an office visit with the authorizing provider or a provider within the authorizing providers department within the previous 12 mos or has a future within next 30 days. See \"Patient Info\" tab in inbasket, or \"Choose Columns\" in Meds & Orders section of the refill encounter.              Passed - Not on Clopidogrel (unless Pantoprazole ordered)        Passed - No diagnosis of osteoporosis on record        Passed - Medication is active on med list        Passed - Patient is age 18 or older             Shanelle Laurent RN 05/30/23 8:01 PM  "

## 2023-08-21 DIAGNOSIS — K21.00 GASTROESOPHAGEAL REFLUX DISEASE WITH ESOPHAGITIS WITHOUT HEMORRHAGE: ICD-10-CM

## 2023-08-21 RX ORDER — PANTOPRAZOLE SODIUM 40 MG/1
TABLET, DELAYED RELEASE ORAL
Qty: 90 TABLET | Refills: 0 | Status: SHIPPED | OUTPATIENT
Start: 2023-08-21 | End: 2023-11-27

## 2023-11-25 DIAGNOSIS — K21.00 GASTROESOPHAGEAL REFLUX DISEASE WITH ESOPHAGITIS WITHOUT HEMORRHAGE: ICD-10-CM

## 2023-11-27 RX ORDER — PANTOPRAZOLE SODIUM 40 MG/1
TABLET, DELAYED RELEASE ORAL
Qty: 90 TABLET | Refills: 0 | Status: SHIPPED | OUTPATIENT
Start: 2023-11-27 | End: 2024-02-26

## 2024-02-26 DIAGNOSIS — K21.00 GASTROESOPHAGEAL REFLUX DISEASE WITH ESOPHAGITIS WITHOUT HEMORRHAGE: ICD-10-CM

## 2024-02-26 RX ORDER — PANTOPRAZOLE SODIUM 40 MG/1
TABLET, DELAYED RELEASE ORAL
Qty: 90 TABLET | Refills: 0 | Status: SHIPPED | OUTPATIENT
Start: 2024-02-26 | End: 2024-05-29

## 2024-05-29 DIAGNOSIS — K21.00 GASTROESOPHAGEAL REFLUX DISEASE WITH ESOPHAGITIS WITHOUT HEMORRHAGE: ICD-10-CM

## 2024-05-29 RX ORDER — PANTOPRAZOLE SODIUM 40 MG/1
TABLET, DELAYED RELEASE ORAL
Qty: 90 TABLET | Refills: 0 | Status: SHIPPED | OUTPATIENT
Start: 2024-05-29 | End: 2024-09-03

## 2024-08-29 DIAGNOSIS — K21.00 GASTROESOPHAGEAL REFLUX DISEASE WITH ESOPHAGITIS WITHOUT HEMORRHAGE: ICD-10-CM

## 2024-09-03 RX ORDER — PANTOPRAZOLE SODIUM 40 MG/1
TABLET, DELAYED RELEASE ORAL
Qty: 90 TABLET | Refills: 0 | Status: SHIPPED | OUTPATIENT
Start: 2024-09-03

## 2024-12-02 DIAGNOSIS — K21.00 GASTROESOPHAGEAL REFLUX DISEASE WITH ESOPHAGITIS WITHOUT HEMORRHAGE: ICD-10-CM

## 2024-12-02 RX ORDER — PANTOPRAZOLE SODIUM 40 MG/1
40 TABLET, DELAYED RELEASE ORAL DAILY
Qty: 90 TABLET | Refills: 0 | Status: SHIPPED | OUTPATIENT
Start: 2024-12-02